# Patient Record
Sex: FEMALE | Race: WHITE | NOT HISPANIC OR LATINO | Employment: FULL TIME | URBAN - METROPOLITAN AREA
[De-identification: names, ages, dates, MRNs, and addresses within clinical notes are randomized per-mention and may not be internally consistent; named-entity substitution may affect disease eponyms.]

---

## 2017-04-27 ENCOUNTER — TRANSFERRED RECORDS (OUTPATIENT)
Dept: HEALTH INFORMATION MANAGEMENT | Facility: CLINIC | Age: 32
End: 2017-04-27

## 2017-05-02 ENCOUNTER — TRANSFERRED RECORDS (OUTPATIENT)
Dept: HEALTH INFORMATION MANAGEMENT | Facility: CLINIC | Age: 32
End: 2017-05-02

## 2017-07-22 ENCOUNTER — HEALTH MAINTENANCE LETTER (OUTPATIENT)
Age: 32
End: 2017-07-22

## 2017-10-23 ENCOUNTER — OFFICE VISIT (OUTPATIENT)
Dept: FAMILY MEDICINE | Facility: CLINIC | Age: 32
End: 2017-10-23
Payer: COMMERCIAL

## 2017-10-23 VITALS
TEMPERATURE: 98 F | HEART RATE: 76 BPM | SYSTOLIC BLOOD PRESSURE: 119 MMHG | WEIGHT: 182 LBS | DIASTOLIC BLOOD PRESSURE: 78 MMHG | HEIGHT: 64 IN | OXYGEN SATURATION: 98 % | BODY MASS INDEX: 31.07 KG/M2

## 2017-10-23 DIAGNOSIS — Z01.818 PREOP GENERAL PHYSICAL EXAM: Primary | ICD-10-CM

## 2017-10-23 DIAGNOSIS — N63.0 LUMP OR MASS IN BREAST: ICD-10-CM

## 2017-10-23 DIAGNOSIS — Z13.6 CARDIOVASCULAR SCREENING; LDL GOAL LESS THAN 160: ICD-10-CM

## 2017-10-23 LAB
BETA HCG QUAL IFA URINE: NEGATIVE
ERYTHROCYTE [DISTWIDTH] IN BLOOD BY AUTOMATED COUNT: 12.7 % (ref 10–15)
HCT VFR BLD AUTO: 41.4 % (ref 35–47)
HGB BLD-MCNC: 13.9 G/DL (ref 11.7–15.7)
MCH RBC QN AUTO: 31.4 PG (ref 26.5–33)
MCHC RBC AUTO-ENTMCNC: 33.6 G/DL (ref 31.5–36.5)
MCV RBC AUTO: 94 FL (ref 78–100)
PLATELET # BLD AUTO: 188 10E9/L (ref 150–450)
RBC # BLD AUTO: 4.42 10E12/L (ref 3.8–5.2)
WBC # BLD AUTO: 8.9 10E9/L (ref 4–11)

## 2017-10-23 PROCEDURE — 85027 COMPLETE CBC AUTOMATED: CPT | Performed by: FAMILY MEDICINE

## 2017-10-23 PROCEDURE — 80048 BASIC METABOLIC PNL TOTAL CA: CPT | Performed by: FAMILY MEDICINE

## 2017-10-23 PROCEDURE — 93000 ELECTROCARDIOGRAM COMPLETE: CPT | Performed by: FAMILY MEDICINE

## 2017-10-23 PROCEDURE — 84703 CHORIONIC GONADOTROPIN ASSAY: CPT | Performed by: FAMILY MEDICINE

## 2017-10-23 PROCEDURE — 36415 COLL VENOUS BLD VENIPUNCTURE: CPT | Performed by: FAMILY MEDICINE

## 2017-10-23 PROCEDURE — 80061 LIPID PANEL: CPT | Performed by: FAMILY MEDICINE

## 2017-10-23 PROCEDURE — 99214 OFFICE O/P EST MOD 30 MIN: CPT | Performed by: FAMILY MEDICINE

## 2017-10-23 NOTE — NURSING NOTE
"Chief Complaint   Patient presents with     Pre-Op Exam       Initial /78  Pulse 76  Temp 98  F (36.7  C) (Oral)  Ht 5' 3.5\" (1.613 m)  Wt 182 lb (82.6 kg)  LMP 09/28/2017  SpO2 98%  BMI 31.73 kg/m2 Estimated body mass index is 31.73 kg/(m^2) as calculated from the following:    Height as of this encounter: 5' 3.5\" (1.613 m).    Weight as of this encounter: 182 lb (82.6 kg).  Medication Reconciliation: complete   Alison Rodriguez Certified Medical Assistant    "
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
## 2017-10-23 NOTE — MR AVS SNAPSHOT
After Visit Summary   10/23/2017    Melisa Burns    MRN: [id number]           Patient Information     Date Of Birth          1985        Visit Information        Provider Department      10/23/2017 9:40 AM Weiler, Karen, MD [facility]        Today's Diagnoses     Preop general physical exam    -  1      Care Instructions      Before Your Surgery      Call your surgeon if there is any change in your health. This includes signs of a cold or flu (such as a sore throat, runny nose, cough, rash or fever).    Do not smoke, drink alcohol or take over the counter medicine (unless your surgeon or primary care doctor tells you to) for the 24 hours before and after surgery.    If you take prescribed drugs: Follow your doctor s orders about which medicines to take and which to stop until after surgery.    Eating and drinking prior to surgery: follow the instructions from your surgeon    Take a shower or bath the night before surgery. Use the soap your surgeon gave you to gently clean your skin. If you do not have soap from your surgeon, use your regular soap. Do not shave or scrub the surgery site.  Wear clean pajamas and have clean sheets on your bed.     [facility]  Radiology should be contacting you to schedule your Mammogram/US.  If they do not contact you, you can call [phone] to make an appointment.              Follow-ups after your visit        Who to contact     If you have questions or need follow up information about today's clinic visit or your schedule please contact [facility] directly at [phone].  Normal or non-critical lab and imaging results will be communicated to you by MyChart, letter or phone within 4 business days after the clinic has received the results. If you do not hear from us within 7 days, please contact the clinic through MyChart or phone. If you have a critical or abnormal lab result, we will notify you by phone as soon as  "possible.  Submit refill requests through [email or web address] or call your pharmacy and they will forward the refill request to us. Please allow 3 business days for your refill to be completed.          Additional Information About Your Visit        SPORTLOG[employer]Qhart Information     [email or web address] gives you secure access to your electronic health record. If you see a primary care provider, you can also send messages to your care team and make appointments. If you have questions, please call your primary care clinic.  If you do not have a primary care provider, please call [phone] and they will assist you.        Care EveryWhere ID     This is your Care EveryWhere ID. This could be used by other organizations to access your [facility] medical records  [id number]        Your Vitals Were     Pulse Temperature Height Last Period Pulse Oximetry BMI (Body Mass Index)    76 98  F (36.7  C) (Oral) 5' 3.5\" (1.613 m) 09/28/2017 98% 31.73 kg/m2       Blood Pressure from Last 3 Encounters:   10/23/17 119/78   11/07/16 126/84   01/05/16 118/76    Weight from Last 3 Encounters:   10/23/17 182 lb (82.6 kg)   11/07/16 184 lb (83.5 kg)   01/05/16 192 lb (87.1 kg)              We Performed the Following     Beta HCG qual IFA urine - [facility] and [facility]     EKG 12-lead complete w/read - Clinics        Primary Care Provider Office Phone # Fax #    Aniyah JAYLON Geller [phone]       [address] [place] [address]        Equal Access to Services     [facility] AH: Hadii aad ku hadasho Soomaali, waaxda luqadaha, qaybta kaalmada adeegyada, raina benson. So [facility] [phone].    ATENCIÓN: Si habla español, tiene a duke disposición servicios gratuitos de asistencia lingüística. Llame al [phone].    We comply with applicable federal civil rights laws and [place] laws. We do not discriminate on the basis of race, color, national origin, age, disability, sex, sexual orientation, or gender " identity.            Thank you!     Thank you for choosing [facility]  for your care. Our goal is always to provide you with excellent care. Hearing back from our patients is one way we can continue to improve our services. Please take a few minutes to complete the written survey that you may receive in the mail after your visit with us. Thank you!             Your Updated Medication List - Protect others around you: Learn how to safely use, store and throw away your medicines at www.disposemymeds.org.      Notice  As of 10/23/2017 10:43 AM    You have not been prescribed any medications.

## 2017-10-23 NOTE — PATIENT INSTRUCTIONS
Before Your Surgery      Call your surgeon if there is any change in your health. This includes signs of a cold or flu (such as a sore throat, runny nose, cough, rash or fever).    Do not smoke, drink alcohol or take over the counter medicine (unless your surgeon or primary care doctor tells you to) for the 24 hours before and after surgery.    If you take prescribed drugs: Follow your doctor s orders about which medicines to take and which to stop until after surgery.    Eating and drinking prior to surgery: follow the instructions from your surgeon    Take a shower or bath the night before surgery. Use the soap your surgeon gave you to gently clean your skin. If you do not have soap from your surgeon, use your regular soap. Do not shave or scrub the surgery site.  Wear clean pajamas and have clean sheets on your bed.     [facility]  Radiology should be contacting you to schedule your Mammogram/US.  If they do not contact you, you can call [phone] to make an appointment.

## 2017-10-24 LAB
ANION GAP SERPL CALCULATED.3IONS-SCNC: 7 MMOL/L (ref 3–14)
BUN SERPL-MCNC: 10 MG/DL (ref 7–30)
CALCIUM SERPL-MCNC: 8.6 MG/DL (ref 8.5–10.1)
CHLORIDE SERPL-SCNC: 102 MMOL/L (ref 94–109)
CHOLEST SERPL-MCNC: 183 MG/DL
CO2 SERPL-SCNC: 25 MMOL/L (ref 20–32)
CREAT SERPL-MCNC: 0.74 MG/DL (ref 0.52–1.04)
GFR SERPL CREATININE-BSD FRML MDRD: >90 ML/MIN/1.7M2
GLUCOSE SERPL-MCNC: 69 MG/DL (ref 70–99)
HDLC SERPL-MCNC: 57 MG/DL
LDLC SERPL CALC-MCNC: 101 MG/DL
NONHDLC SERPL-MCNC: 126 MG/DL
POTASSIUM SERPL-SCNC: 3.5 MMOL/L (ref 3.4–5.3)
SODIUM SERPL-SCNC: 134 MMOL/L (ref 133–144)
TRIGL SERPL-MCNC: 124 MG/DL

## 2017-10-24 NOTE — PROGRESS NOTES
Dear Melisa,    Dr. Weiler has reviewed your recent labs, which were all normal.    If you have further questions about the interpretation of your labs, [email or web address] is a good website to check out for further information.    Please contact the clinic if you have additional questions.  Thank you.    Sincerely,    Leslie Barajas PA-C  Physician extender for Dr. Karen Weiler

## 2017-10-25 ENCOUNTER — HOSPITAL ENCOUNTER (OUTPATIENT)
Dept: ULTRASOUND IMAGING | Facility: CLINIC | Age: 32
End: 2017-10-25
Attending: FAMILY MEDICINE
Payer: COMMERCIAL

## 2017-10-25 ENCOUNTER — HOSPITAL ENCOUNTER (OUTPATIENT)
Dept: MAMMOGRAPHY | Facility: CLINIC | Age: 32
Discharge: HOME | End: 2017-10-25
Attending: FAMILY MEDICINE | Admitting: FAMILY MEDICINE
Payer: COMMERCIAL

## 2017-10-25 DIAGNOSIS — N63.0 LUMP OR MASS IN BREAST: ICD-10-CM

## 2017-10-25 PROCEDURE — 76642 ULTRASOUND BREAST LIMITED: CPT | Mod: RT

## 2017-10-25 PROCEDURE — G0204 DX MAMMO INCL CAD BI: HCPCS

## 2017-10-25 NOTE — LETTER
[facility] Breast Ultrasound  [address] [facility], [address]  [facility] [address]                                                                                                              Melisa Burns  [address] [place] [address]    October 25, 2017  Date of Exam:     Dear Melisa:    Thank you for your recent visit.  Breast Imaging Result: We are pleased to inform you that the results of your recent breast imaging show no evidence of malignancy (cancer).    Breast Density: Your mammogram shows that you have dense breast tissue. This means you have a slightly higher risk of getting breast cancer. It also means your mammograms will be harder to read but it doesn't mean that mammograms aren t useful. In fact, yearly mammograms are even more important for women at higher risk.    If you are experiencing any breast problems such as a lump or localized pain we request that you discuss this with your health care provider if you haven t already done so, as additional testing may be necessary.    As you know, early detection of cancer is very important. Although mammography is the most accurate method for early detection, not all cancers are found through mammography. A thorough examination includes a combination of mammography, physical examination and breast self-examination. Currently the American College of Radiology and the Society of Breast Imaging recommend an annual mammogram for all women beginning at the age of 40.    A report of your breast imaging results was sent to: Karen Weiler    Your breast imaging will become part of your medical file here at [facility] for at least 10 years. You are responsible for informing any new health care provider or breast imaging facility of the date and location of this examination.    We appreciate the opportunity to participate in your health care.    Sincerely,    Michelle Bustillo MD  Interpreting Radiologist  [facility] Breast  Ultrasound

## 2017-10-26 NOTE — PROGRESS NOTES
Dear Melisa,    Your recent breast imaging was normal; there were no concerning findings. We do have the option of having you see a breast specialist if you are interested.    Please contact the clinic if you have additional questions.  Thank you.    Sincerely,    Leslie Barajas PA-C  Physician extender for Dr. Karen Weiler

## 2018-01-15 ENCOUNTER — TRANSFERRED RECORDS (OUTPATIENT)
Dept: HEALTH INFORMATION MANAGEMENT | Facility: CLINIC | Age: 33
End: 2018-01-15

## 2018-09-27 ENCOUNTER — TELEPHONE (OUTPATIENT)
Dept: FAMILY MEDICINE | Facility: CLINIC | Age: 33
End: 2018-09-27

## 2018-09-27 NOTE — TELEPHONE ENCOUNTER
Needs of attention regarding:  -Cervical Cancer Screening    Health Maintenance Topics with due status: Overdue       Topic Date Due    PAP SCREENING Q3 YR (SYSTEM ASSIGNED) 12/01/2014    INFLUENZA VACCINE 09/01/2018     Health Maintenance Topics with due status: Due Soon       Topic Date Due    PHQ-2 Q1 YR 10/23/2018       Communication:  See Letter

## 2018-09-27 NOTE — LETTER
[facility]          [address] [place] [address]                                                                                                       [phone]  September 27, 2018    Melisa Burns  [address] [place] [address]      Dear Melisa,    A review of your record shows that you are due for the following health maintenance exam(s):    -Pap Smear- a screening test done during a pelvic exam to check for abnormal changes in the cells of the cervix. The cervix is the lower part of the uterus that opens into the vagina. Abnormal cells can develop into cancer if not detected and treated. There are no signs or symptoms related to early cervical cancer so a pelvic exam of the female sex organs and a Pap test are needed. Cervical cancer is preventable and curable if abnormal cells are detected and treated early. Pap tests have reduced deaths from cancer of the cervix in the [place] by 70% over the past 50 years.  Please call to arrange this for you or you can also schedule this through [facility] (online medical record access).  Please disregard this reminder if you have already scheduled or have had this exam elsewhere within the last year.  It would be helpful for us to have a copy of your pap smear report in our file so that we can best coordinate your care.     In addition, if we see you for your annual health care maintenance exam (complete physical), and it has been over a year since your last exam, then please schedule that as well.    We now have OB/GYN providers on site! You may schedule with either Michelle RAMEY CNM or Dr. Giaan Martinez at [phone].    Thank you very much for choosing [facility].   We appreciate the opportunity to serve you and look forward to supporting your healthcare needs in the future.

## 2020-02-10 ENCOUNTER — HEALTH MAINTENANCE LETTER (OUTPATIENT)
Age: 35
End: 2020-02-10

## 2020-08-27 ENCOUNTER — TRANSFERRED RECORDS (OUTPATIENT)
Dept: HEALTH INFORMATION MANAGEMENT | Facility: CLINIC | Age: 35
End: 2020-08-27

## 2020-09-09 ENCOUNTER — TRANSFERRED RECORDS (OUTPATIENT)
Dept: HEALTH INFORMATION MANAGEMENT | Facility: CLINIC | Age: 35
End: 2020-09-09

## 2020-11-16 ENCOUNTER — HEALTH MAINTENANCE LETTER (OUTPATIENT)
Age: 35
End: 2020-11-16

## 2020-11-25 ENCOUNTER — TRANSFERRED RECORDS (OUTPATIENT)
Dept: HEALTH INFORMATION MANAGEMENT | Facility: CLINIC | Age: 35
End: 2020-11-25

## 2020-11-30 ENCOUNTER — NURSE TRIAGE (OUTPATIENT)
Dept: NURSING | Facility: CLINIC | Age: 35
End: 2020-11-30

## 2020-11-30 NOTE — TELEPHONE ENCOUNTER
Melisa reports daily headaches x 1.5 weeks. Advil not helping. Rated 6/10.  Also developed left upper arm pain about the same time. Not sure if she slept on it.   With occasional dizziness.  Sinuses have been clogged about the same time.    Had COVID exposure Nov 9.  Tested negative for COVID around 11/18. No known recent exposure to COVID.    No fever, no runny nose, no cough, no SOB.    PLAN:  - office visit tomorrow  Call back for further concerns   ED for severe headache  Patient verbalized understanding.    Holly Motley RN/[place] Nurse Advisor          Additional Information    Negative: Weakness of the face, arm or leg on one side of the body and new onset    Negative: Numbness of the face, arm or leg on one side of the body and new onset    Negative: Difficult to awaken or acting confused (e.g., disoriented, slurred speech)    Negative: Loss of speech or garbled speech and new onset    Negative: Passed out (i.e., fainted, collapsed and was not responding)    Negative: Sounds like a life-threatening emergency to the triager    Negative: Unable to walk without falling    Negative: Stiff neck (can't touch chin to chest)    Negative: Possibility of carbon monoxide exposure    Negative: SEVERE headache, states 'worst headache' of life    Negative: SEVERE headache, sudden onset (i.e., reaching maximum intensity within 30 seconds)    Negative: Severe pain in one eye    Negative: Loss of vision or double vision    Negative: Patient sounds very sick or weak to the triager    Negative: Fever > 103 F (39.4 C)    Negative: Fever > 100.0 F (37.8 C) and has diabetes mellitus or a weak immune system (e.g., HIV positive, cancer chemotherapy, organ transplant, splenectomy, chronic steroids)    Negative: SEVERE headache (e.g., excruciating) and has had severe headaches before    Negative: SEVERE headache and not relieved by pain meds    Negative: SEVERE headache and vomiting    Negative: SEVERE headache and fever     Negative: New headache and weak immune system (e.g., HIV positive, cancer chemotherapy, chronic steroid treatment)    Negative: Fever present > 3 days (72 hours)    Negative: Patient wants to be seen    Unexplained headache that is present > 24 hours    Protocols used: HEADACHE-A-OH

## 2020-12-04 ENCOUNTER — OFFICE VISIT (OUTPATIENT)
Dept: FAMILY MEDICINE | Facility: CLINIC | Age: 35
End: 2020-12-04
Payer: COMMERCIAL

## 2020-12-04 VITALS
OXYGEN SATURATION: 99 % | SYSTOLIC BLOOD PRESSURE: 122 MMHG | WEIGHT: 197 LBS | HEIGHT: 64 IN | TEMPERATURE: 98.4 F | HEART RATE: 74 BPM | DIASTOLIC BLOOD PRESSURE: 74 MMHG | BODY MASS INDEX: 33.63 KG/M2

## 2020-12-04 DIAGNOSIS — E55.9 VITAMIN D DEFICIENCY: ICD-10-CM

## 2020-12-04 DIAGNOSIS — H53.8 BLURRED VISION: ICD-10-CM

## 2020-12-04 DIAGNOSIS — M79.662 PAIN OF LEFT CALF: ICD-10-CM

## 2020-12-04 DIAGNOSIS — R29.2 DECREASED PATELLAR REFLEX: ICD-10-CM

## 2020-12-04 DIAGNOSIS — J35.1 ENLARGED TONSILS: ICD-10-CM

## 2020-12-04 DIAGNOSIS — R51.9 ACUTE NONINTRACTABLE HEADACHE, UNSPECIFIED HEADACHE TYPE: Primary | ICD-10-CM

## 2020-12-04 DIAGNOSIS — Z13.220 SCREENING FOR CHOLESTEROL LEVEL: ICD-10-CM

## 2020-12-04 DIAGNOSIS — Z23 NEED FOR INFLUENZA VACCINATION: ICD-10-CM

## 2020-12-04 LAB
ALBUMIN SERPL-MCNC: 3.8 G/DL (ref 3.4–5)
ALP SERPL-CCNC: 39 U/L (ref 40–150)
ALT SERPL W P-5'-P-CCNC: 77 U/L (ref 0–50)
ANION GAP SERPL CALCULATED.3IONS-SCNC: 4 MMOL/L (ref 3–14)
AST SERPL W P-5'-P-CCNC: 38 U/L (ref 0–45)
BILIRUB SERPL-MCNC: 1.5 MG/DL (ref 0.2–1.3)
BUN SERPL-MCNC: 11 MG/DL (ref 7–30)
CALCIUM SERPL-MCNC: 9 MG/DL (ref 8.5–10.1)
CHLORIDE SERPL-SCNC: 106 MMOL/L (ref 94–109)
CHOLEST SERPL-MCNC: 232 MG/DL
CO2 SERPL-SCNC: 27 MMOL/L (ref 20–32)
CREAT SERPL-MCNC: 0.85 MG/DL (ref 0.52–1.04)
DEPRECATED S PYO AG THROAT QL EIA: NEGATIVE
ERYTHROCYTE [DISTWIDTH] IN BLOOD BY AUTOMATED COUNT: 11.5 % (ref 10–15)
GFR SERPL CREATININE-BSD FRML MDRD: 87 ML/MIN/{1.73_M2}
GLUCOSE SERPL-MCNC: 92 MG/DL (ref 70–99)
HBA1C MFR BLD: 5.1 % (ref 0–5.6)
HCT VFR BLD AUTO: 40.1 % (ref 35–47)
HDLC SERPL-MCNC: 50 MG/DL
HGB BLD-MCNC: 13.3 G/DL (ref 11.7–15.7)
LDLC SERPL CALC-MCNC: 150 MG/DL
MCH RBC QN AUTO: 30.2 PG (ref 26.5–33)
MCHC RBC AUTO-ENTMCNC: 33.2 G/DL (ref 31.5–36.5)
MCV RBC AUTO: 91 FL (ref 78–100)
NONHDLC SERPL-MCNC: 182 MG/DL
PLATELET # BLD AUTO: 193 10E9/L (ref 150–450)
POTASSIUM SERPL-SCNC: 3.8 MMOL/L (ref 3.4–5.3)
PROT SERPL-MCNC: 7.6 G/DL (ref 6.8–8.8)
RBC # BLD AUTO: 4.41 10E12/L (ref 3.8–5.2)
SODIUM SERPL-SCNC: 137 MMOL/L (ref 133–144)
SPECIMEN SOURCE: NORMAL
SPECIMEN SOURCE: NORMAL
STREP GROUP A PCR: NOT DETECTED
TRIGL SERPL-MCNC: 161 MG/DL
TSH SERPL DL<=0.005 MIU/L-ACNC: 0.8 MU/L (ref 0.4–4)
WBC # BLD AUTO: 6 10E9/L (ref 4–11)

## 2020-12-04 PROCEDURE — 87651 STREP A DNA AMP PROBE: CPT | Performed by: NURSE PRACTITIONER

## 2020-12-04 PROCEDURE — 99N1174 PR STATISTIC STREP A RAPID: Performed by: NURSE PRACTITIONER

## 2020-12-04 PROCEDURE — 80061 LIPID PANEL: CPT | Performed by: NURSE PRACTITIONER

## 2020-12-04 PROCEDURE — 82306 VITAMIN D 25 HYDROXY: CPT | Performed by: NURSE PRACTITIONER

## 2020-12-04 PROCEDURE — 85027 COMPLETE CBC AUTOMATED: CPT | Performed by: NURSE PRACTITIONER

## 2020-12-04 PROCEDURE — 83036 HEMOGLOBIN GLYCOSYLATED A1C: CPT | Performed by: NURSE PRACTITIONER

## 2020-12-04 PROCEDURE — 36415 COLL VENOUS BLD VENIPUNCTURE: CPT | Performed by: NURSE PRACTITIONER

## 2020-12-04 PROCEDURE — 80053 COMPREHEN METABOLIC PANEL: CPT | Performed by: NURSE PRACTITIONER

## 2020-12-04 PROCEDURE — 84443 ASSAY THYROID STIM HORMONE: CPT | Performed by: NURSE PRACTITIONER

## 2020-12-04 PROCEDURE — 99214 OFFICE O/P EST MOD 30 MIN: CPT | Mod: 25 | Performed by: NURSE PRACTITIONER

## 2020-12-04 PROCEDURE — 90686 IIV4 VACC NO PRSV 0.5 ML IM: CPT | Performed by: NURSE PRACTITIONER

## 2020-12-04 PROCEDURE — 90471 IMMUNIZATION ADMIN: CPT | Performed by: NURSE PRACTITIONER

## 2020-12-04 RX ORDER — METHYLPREDNISOLONE 4 MG
TABLET, DOSE PACK ORAL
Qty: 21 TABLET | Refills: 0 | Status: SHIPPED | OUTPATIENT
Start: 2020-12-04 | End: 2020-12-18

## 2020-12-04 ASSESSMENT — MIFFLIN-ST. JEOR: SCORE: 1568.59

## 2020-12-04 NOTE — PROGRESS NOTES
Subjective   Melisa Burns is a 35 year old female who presents to clinic today for the following health issues:    HPI   Headache  Onset/Duration: x3 weeks  Description  Location: sides - top,    Character: hard to describe -   Frequency:  daily  Duration:  hours  Wake with headaches: no  Able to do daily activities when headache present: YES- couple times not  Intensity:  5-6/10  Progression of Symptoms:  worsening  Accompanying signs and symptoms:  Stiff neck: YES - right side  Neck or upper back pain: YES- right neck - left upper arm pain  Sinus or URI symptoms YES- little congested  Fever: no  Nausea or vomiting: no  Dizziness: YES- couple times when HA is really bad  Numbness/tingling: no  Weakness: no  Visual changes: Yesterday blurry - could not focus on computer - better yesterday afternoon and today  History  Head trauma: YES- mild wfpsovpoju-8-3 years ago - slipped in garage and hit head on concrete  Family history of migraines: no  Daily pain medication use: no  Previous tests for headaches: no  Neurologist evaluation: no  Precipitating or Alleviating factors (light/sound/sleep/caffeine): Sleep - better when wakes up. No alcohol 6-8 weeks ago.  New job -   Therapies tried and outcome: Ibuprofen 400mg PRN (Advil, Motrin)              Outcome - no longer effective  Frequent/daily pain medication use: no    Has been working with ortho for over 6 months on left leg pain.    COVID-negative.    Computer. Hair up Hair down.   Daily Early afternoon.  EYE exam; Lasix 3 years.     New job 6 weeks ago.       Reviewed and updated as needed this visit by provider:  Tobacco  Allergies  Meds  Problems  Med Hx  Surg Hx  Fam Hx          Review of Systems   Constitutional, HEENT, cardiovascular, pulmonary, GI, , musculoskeletal, neuro, skin, endocrine and psych systems are negative, except as otherwise noted in the HPI.          Objective   /74   Pulse 74   Temp 98.4  F (36.9  C)  "(Tympanic)   Ht 1.626 m (5' 4\")   Wt 89.4 kg (197 lb)   LMP 11/01/2020 (Exact Date)   SpO2 99%   Breastfeeding No   BMI 33.81 kg/m   Body mass index is 33.81 kg/m .  Physical Exam   GENERAL: healthy, alert, well nourished, well hydrated, no distress  EYES: Eyes grossly normal to inspection, extraocular movements - intact, and PERRL  HENT: ear canals- normal; TMs- normal; Nose- normal; Mouth- no ulcers, no lesions enlarged versus hypertrophic tonsils bilateral  NECK: no tenderness, no adenopathy, no asymmetry, no masses, no stiffness; thyroid- normal to palpation  RESP: lungs clear to auscultation - no rales, no rhonchi, no wheezes  CV: regular rates and rhythm, normal S1 S2, no S3 or S4 and no murmur, no click or rub -  ABDOMEN: soft, no tenderness, no  hepatosplenomegaly, no masses, normal bowel sounds  MS: extremities- no gross deformities noted, no edema  SKIN: no suspicious lesions, no rashes  NEURO: strength and tone- normal, sensory exam- grossly normal, mentation- intact, speech- normal, reflexes- symmetric decreaed patellar reflexes.   PSYCH: Alert and oriented times 3; speech- coherent , normal rate and volume; able to articulate logical thoughts, able to abstract reason, no tangential thoughts, no hallucinations or delusions, affect- normal  LYMPHATICS: ant. cervical- normal, post. cervical- normal, axillary- normal, supraclavicular- normal, inguinal- normal    Diagnostic Test Results  Pending      Assessment & Plan   Melisa was seen today for headache and new patient.    Diagnoses and all orders for this visit:    Acute nonintractable headache, unspecified headache type   Blurred vision  Decreased patellar reflex   Hard to describe persisting headache and blurred vision.  Exam reassuring does have decreased patellar reflexes.   Labs.   Steroids.   Imaging to rule out demyelinating disease or other etiology. Encourage her to ask insurance about coverage.   Close follow up in clinic within 2 weeks. "   Red flag symptoms discussed and if these occur present to the emergency room or call 911.  Melisa verbalizes understanding of plan of care and is in agreement.   -     MR Brain w/o & w Contrast; Future  -     MR Cervical Spine w/o & w Contrast; Future  -     TSH with free T4 reflex  -     CBC with platelets  -     Comprehensive metabolic panel (BMP + Alb, Alk Phos, ALT, AST, Total. Bili, TP)  -     methylPREDNISolone (MEDROL DOSEPAK) 4 MG tablet therapy pack; Follow Package Directions  -     Hemoglobin A1c  -     Streptococcus A Rapid Scr w Reflx to PCR  -     Group A Streptococcus PCR Throat Swab  -     MR Brain w/o & w Contrast; Future  -     MR Cervical Spine w/o & w Contrast; Future    Pain of left calf  Continue follow up with Ortho.    Vitamin D deficiency  -     Vitamin D Deficiency    Enlarged tonsils  -     Streptococcus A Rapid Scr w Reflx to PCR    Screening for cholesterol level  -     Lipid panel reflex to direct LDL Fasting    Need for influenza vaccination  -     INFLUENZA VACCINE IM > 6 MONTHS VALENT IIV4 [50371]    Other orders  -     Cancel: FLU VAC QUAD SPLIT VIR, IM (6+ MO)      See Patient Instructions    Return in about 2 weeks (around 12/18/2020) for Wellness exam fasting labs, Recheck.     ELIZABETH Thakur-[address] [address]  [email or web address]   Office: [phone]

## 2020-12-04 NOTE — PATIENT INSTRUCTIONS
Patient Education     Understanding Headache Pain  Headache pain can start in different structures in the head. The brain itself doesn't hurt, but other parts of the head do. Headache is a common symptom of illness, such as a cold or the flu. At other times, headaches happen without seeming to be connected to any illness. These are known as primary headaches. Examples of primary headaches include migraine and tension headaches. Very rarely are headaches a sign of a serious medical problem.     What is referred pain?  Referred pain has its source in one place, but is felt in another. For example, pain behind the eyes may actually be caused by tense muscles in the neck and shoulders. This means that the place that hurts may not be the part of the head that needs treatment.   [employer] last reviewed this educational content on 5/1/2018 2000-2020 [employer]. [address] [place] [address], [place] [address]. All rights reserved. This information is not intended as a substitute for professional medical care. Always follow your healthcare professional's instructions.

## 2020-12-08 LAB — DEPRECATED CALCIDIOL+CALCIFEROL SERPL-MC: 29 UG/L (ref 20–75)

## 2020-12-10 NOTE — RESULT ENCOUNTER NOTE
Dear Melisa,    Here is a summary of your recent test results:    Most of your labs looked good expect some mild abnormals described below.     I have ordered your brain and neck MRI; they should be calling you to schedule. It is certainly reasonable to ask your insurance ahead of time if there is any concern for the coverage of this.     Vitamin D is on the low end of normal; I it above 30. I would recommended daily vitamin D 2,000 international units daily.     -LDL(bad) cholesterol level is elevated, and your triglycerides are elevated which can increase your heart disease risk.  A diet high in fat and simple carbohydrates, genetics and being overweight can contribute to this. ADVISE: exercising 150 minutes of aerobic exercise per week (30 minutes for 5 days per week or 50 minutes for 3 days per week are options), eating a low saturated fat/low carbohydrate diet, and omega-3 fatty acids (fish oil) 0295-5687 mg daily are helpful to improve this. In 3 months, you should recheck your fasting cholesterol panel by scheduling a lab-only appointment.    -Liver and gallbladder tests (ALT,AST, Alk phos,bilirubin) are slightly elevated. I don't see previous liver function to compare this lab too. I encourage you to limit alcohol and Tylenol usage and we will repeat this lab in 3 months.     For additional lab test information, labtestsonline.org is an excellent reference.    In addition, here is a list of due or overdue Health Maintenance reminders:    ANNUAL REVIEW OF  ORDERS due on 1984  Hepatitis C Screening due on 01/17/2002  Preventive Care Visit due on 04/03/2008  PAP Smear due on 12/01/2014  PHQ-2 due on 01/01/2020    Please call us at [phone] (or use [employer]) to address the above recommendations if needed.    Thank you for choosing [facility].  It was an honor and a privilege to participate in your care.       Healthy regards,    Elizabeth Barajas, ELIZABETH  [facility]

## 2020-12-18 ENCOUNTER — OFFICE VISIT (OUTPATIENT)
Dept: FAMILY MEDICINE | Facility: CLINIC | Age: 35
End: 2020-12-18
Payer: COMMERCIAL

## 2020-12-18 VITALS
BODY MASS INDEX: 33.29 KG/M2 | HEART RATE: 72 BPM | DIASTOLIC BLOOD PRESSURE: 77 MMHG | OXYGEN SATURATION: 100 % | HEIGHT: 64 IN | WEIGHT: 195 LBS | SYSTOLIC BLOOD PRESSURE: 118 MMHG | TEMPERATURE: 98.3 F

## 2020-12-18 DIAGNOSIS — Z11.59 NEED FOR HEPATITIS C SCREENING TEST: ICD-10-CM

## 2020-12-18 DIAGNOSIS — M25.50 ARTHRALGIA, UNSPECIFIED JOINT: ICD-10-CM

## 2020-12-18 DIAGNOSIS — R74.01 ELEVATED ALT MEASUREMENT: ICD-10-CM

## 2020-12-18 DIAGNOSIS — R51.9 ACUTE NONINTRACTABLE HEADACHE, UNSPECIFIED HEADACHE TYPE: ICD-10-CM

## 2020-12-18 DIAGNOSIS — M79.662 PAIN OF LEFT CALF: ICD-10-CM

## 2020-12-18 DIAGNOSIS — Z12.4 SCREENING FOR MALIGNANT NEOPLASM OF CERVIX: ICD-10-CM

## 2020-12-18 DIAGNOSIS — Z00.00 ROUTINE GENERAL MEDICAL EXAMINATION AT A HEALTH CARE FACILITY: Primary | ICD-10-CM

## 2020-12-18 PROCEDURE — 99213 OFFICE O/P EST LOW 20 MIN: CPT | Mod: 25 | Performed by: NURSE PRACTITIONER

## 2020-12-18 PROCEDURE — 99395 PREV VISIT EST AGE 18-39: CPT | Performed by: NURSE PRACTITIONER

## 2020-12-18 ASSESSMENT — MIFFLIN-ST. JEOR: SCORE: 1559.51
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
## 2021-01-05 DIAGNOSIS — M25.50 ARTHRALGIA, UNSPECIFIED JOINT: ICD-10-CM

## 2021-01-05 DIAGNOSIS — Z11.59 NEED FOR HEPATITIS C SCREENING TEST: ICD-10-CM

## 2021-01-05 DIAGNOSIS — R74.01 ELEVATED ALT MEASUREMENT: ICD-10-CM

## 2021-01-05 DIAGNOSIS — R51.9 ACUTE NONINTRACTABLE HEADACHE, UNSPECIFIED HEADACHE TYPE: ICD-10-CM

## 2021-01-05 LAB
CRP SERPL-MCNC: <2.9 MG/L (ref 0–8)
ERYTHROCYTE [SEDIMENTATION RATE] IN BLOOD BY WESTERGREN METHOD: 12 MM/H (ref 0–20)
HCV AB SERPL QL IA: NONREACTIVE

## 2021-01-05 PROCEDURE — 86038 ANTINUCLEAR ANTIBODIES: CPT | Performed by: NURSE PRACTITIONER

## 2021-01-05 PROCEDURE — 86140 C-REACTIVE PROTEIN: CPT | Performed by: NURSE PRACTITIONER

## 2021-01-05 PROCEDURE — 86039 ANTINUCLEAR ANTIBODIES (ANA): CPT | Performed by: NURSE PRACTITIONER

## 2021-01-05 PROCEDURE — 86431 RHEUMATOID FACTOR QUANT: CPT | Performed by: NURSE PRACTITIONER

## 2021-01-05 PROCEDURE — 86618 LYME DISEASE ANTIBODY: CPT | Performed by: NURSE PRACTITIONER

## 2021-01-05 PROCEDURE — 36415 COLL VENOUS BLD VENIPUNCTURE: CPT | Performed by: NURSE PRACTITIONER

## 2021-01-05 PROCEDURE — 86200 CCP ANTIBODY: CPT | Performed by: NURSE PRACTITIONER

## 2021-01-05 PROCEDURE — 86803 HEPATITIS C AB TEST: CPT | Performed by: NURSE PRACTITIONER

## 2021-01-05 PROCEDURE — 80076 HEPATIC FUNCTION PANEL: CPT | Performed by: NURSE PRACTITIONER

## 2021-01-05 PROCEDURE — 85652 RBC SED RATE AUTOMATED: CPT | Performed by: NURSE PRACTITIONER

## 2021-01-06 LAB
ALBUMIN SERPL-MCNC: 3.6 G/DL (ref 3.4–5)
ALP SERPL-CCNC: 48 U/L (ref 40–150)
ALT SERPL W P-5'-P-CCNC: 96 U/L (ref 0–50)
ANA PAT SER IF-IMP: ABNORMAL
ANA SER QL IF: POSITIVE
ANA TITR SER IF: ABNORMAL {TITER}
AST SERPL W P-5'-P-CCNC: 40 U/L (ref 0–45)
B BURGDOR IGG+IGM SER QL: 0.17 (ref 0–0.89)
BILIRUB DIRECT SERPL-MCNC: 0.2 MG/DL (ref 0–0.2)
BILIRUB SERPL-MCNC: 1.1 MG/DL (ref 0.2–1.3)
CCP AB SER IA-ACNC: 1 U/ML
PROT SERPL-MCNC: 7.1 G/DL (ref 6.8–8.8)
RHEUMATOID FACT SER NEPH-ACNC: <7 IU/ML (ref 0–20)

## 2021-01-07 ENCOUNTER — TRANSFERRED RECORDS (OUTPATIENT)
Dept: HEALTH INFORMATION MANAGEMENT | Facility: CLINIC | Age: 36
End: 2021-01-07

## 2021-01-08 ENCOUNTER — MYC MEDICAL ADVICE (OUTPATIENT)
Dept: FAMILY MEDICINE | Facility: CLINIC | Age: 36
End: 2021-01-08

## 2021-01-08 NOTE — TELEPHONE ENCOUNTER
Routing to JOSEY Barajas NP for further review/recommendations/orders.  Please see 1/5/20 Results and advise      Roseann Hall RN  [facility]

## 2021-01-11 NOTE — TELEPHONE ENCOUNTER
Called patient @ [phone] -     Advised of results below - Patient stated an understanding and agreed with plan.      Roseann Hall RN  [facility]

## 2021-01-11 NOTE — TELEPHONE ENCOUNTER
Please call patient. She has a elevated ALT increased from last check and a borderline positive AMBROSE.  All the rest of her labs are completely within normal limits.    I am going to do a consultation with rheumatology.  I will review the current labs and see if there is further labs they want me to do or they suggest having an appointment with them. This is a nice service we have now to avoid unnecessary appointments and cost-  I will let her know once I get that answer.    Thanks,      Elizabeth Barajas, FNP-BC

## 2021-01-12 ENCOUNTER — TELEPHONE (OUTPATIENT)
Dept: FAMILY MEDICINE | Facility: CLINIC | Age: 36
End: 2021-01-12

## 2021-01-12 DIAGNOSIS — M25.50 ARTHRALGIA, UNSPECIFIED JOINT: ICD-10-CM

## 2021-01-12 DIAGNOSIS — R76.8 POSITIVE ANA (ANTINUCLEAR ANTIBODY): Primary | ICD-10-CM

## 2021-01-12 DIAGNOSIS — H04.123 DRY EYES: ICD-10-CM

## 2021-01-12 DIAGNOSIS — R51.9 ACUTE NONINTRACTABLE HEADACHE, UNSPECIFIED HEADACHE TYPE: ICD-10-CM

## 2021-01-12 NOTE — TELEPHONE ENCOUNTER
Adv pt of message below.    She would like to move forward with you doing the Econsult.     She would also like to know what the next step is with her ALT lab. Recheck? Liver US? Etc.      Shaneka GOLDMAN RN

## 2021-01-12 NOTE — PROGRESS NOTES
I started the consultation Econsult with rheumatology and there is a possibility of a $114 charge for this consultation.  Insurance may or may not cover this so this could be an out-of-pocket cost for patient.  Please ask her what she would like me to do. I can certainly proceed and get their advice for possible a charge of $114 which may or may not be covered by insurance or I can just send a referral to rheumatology and she can complete an appointment which may have better insurance coverage.      Please let me know.         Elizabeth Barajas, JESSICAP-BC

## 2021-01-12 NOTE — TELEPHONE ENCOUNTER
I started the consultation Econsult with rheumatology and there is a possibility of a $114 charge for this consultation.  Insurance may or may not cover this so this could be an out-of-pocket cost for patient.  Please ask her what she would like me to do. I can certainly proceed and get their advice for possible a charge of $114 which may or may not be covered by insurance or I can just send a referral to rheumatology and she can complete an appointment which may have better insurance coverage.      Please let me know.         Elizabeth Barajas, [facility]-BC  ______________________________________________________________________    Attempt # 1    Called #   Telephone Information:   Mobile [phone]         Left a non detailed VM     Melissa Mcfarland RN, BSN  [place] [facility]     ________________________________________________________________________    The above is from the OV encounter / my chart message 1/8/2021    Attempt # 2    Called #   Telephone Information:   Mobile [phone]         Left a non detailed VM     Melissa Mcfarland RN, BSN  [place] [facility]

## 2021-01-12 NOTE — PROGRESS NOTES
Attempt # 1    Called #   Telephone Information:   Mobile [phone]       Left a non detailed VM     Melissa Mcfarland RN, BSN  [place] [facility]

## 2021-01-12 NOTE — TELEPHONE ENCOUNTER
I will advise on her labs once I consult rheumatology. Consult has been sent.       Thanks,     ELIZABETH Thakur

## 2021-01-12 NOTE — TELEPHONE ENCOUNTER
Patient calls asking to speak to Triage Nurse.  Patient states she had a message left for her to call back.  Patient states she was waiting to hear if anymore lab work needed to be done.  No pending orders.  Please advise.

## 2021-01-19 ENCOUNTER — E-CONSULT (OUTPATIENT)
Dept: RHEUMATOLOGY | Facility: CLINIC | Age: 36
End: 2021-01-19

## 2021-01-19 NOTE — PROGRESS NOTES
1/19/2021     E-Consult has been accepted.    Interprofessional consultation requested by: JOSEY Barajas    Clinical Question/Purpose: ? signficance + AMBROSE    Patient assessment and information reviewed: notes, labs     Recommendations:     Although the AMBROSE of 1:160 is borderline, the speckled pattern could be indicative of a diagnoseable autoimmune disease process, including possible SLE, Sjogren's or Scleroderma.    Suggest:     1. Order DADA, DsDNA, urinalysis( looking for blood and/or protein; don't perform during Menstrual period)     2. Ask  IF PATIENT HAS ANY OF THE FOLLOWING:    - AM stiffness of joints of hands > 30 min: Not Asked    - Sun sensitive rashes: Not asked    - Raynauds Phenomenon (development of cyanosis or significant pallor of hands with cold exposure): Not Asked    - Dry eyes or mouth not associated with a medication: Not Asked     If any of the labs in #1 are positive and/or if any of the clinical symptoms in #2 are present , she should be sent for formal consult.         The recommendations provided in this E-Consult are based on the clinical data available to me at this time, and are furnished without the benefit of a comprehensive in-person or virtual patient evaluation, Any new clinical issues or changes in patient status since the filing of this E-Consult will need to be taken into account when assessing these recommendations. Please contact me if you have further questions.    My total time spent reviewing clinical information and formulating assessment was 5 minutes.    Report sent automatically to requesting provider once signed.     Charge codes: [id number] (5+ minutes)                  [id number] (No Charge code)    Demetri West MD

## 2021-01-21 NOTE — TELEPHONE ENCOUNTER
Inadvertently forwarded to PCP, Routing to Elizabeth Barajas.    Lb MCLEOD RN   [facility]

## 2021-01-21 NOTE — TELEPHONE ENCOUNTER
Please call patient. I have heard back from the e-consult with rheumatology.     They would like the following questions asked if any are yes they would like her to have an appointment with rheumatology if all are no I will complete some further lab tests per their recommendation.     Please route back to me so I am aware of what is going on.     Ask  IF PATIENT HAS ANY OF THE FOLLOWING:    - AM stiffness of joints of hands > 30 min:    - Sun sensitive rashes:    - Raynauds Phenomenon (development of cyanosis or significant pallor of hands with cold exposure):    - Dry eyes or mouth not associated with a medication:     Thanks,      Elizabeth Barajas, FNP-BC

## 2021-01-21 NOTE — TELEPHONE ENCOUNTER
Called # [phone]   AM stiffness of joints of hands > 30 min: No   - Sun sensitive rashes: not positive    - Raynauds Phenomenon (development of cyanosis or significant pallor of hands with cold exposure): No   - Dry eyes or mouth not associated with a medication: Pt noted dry eyes      Lb Villanueva RN   [facility]

## 2021-01-22 NOTE — TELEPHONE ENCOUNTER
Referral was to [facility] - they will call patient to schedule      Roseann Hall RN  [facility]

## 2021-01-22 NOTE — TELEPHONE ENCOUNTER
Given she reports dry eyes according to rheumatology she meets criteria for referral.     Referral to rheumatology placed please fax this referral I believe it needs faxed.       Thanks,       ELIZABETH Thakur

## 2021-02-22 ENCOUNTER — TRANSFERRED RECORDS (OUTPATIENT)
Dept: HEALTH INFORMATION MANAGEMENT | Facility: CLINIC | Age: 36
End: 2021-02-22

## 2021-02-22 ENCOUNTER — OFFICE VISIT - HEALTHEAST (OUTPATIENT)
Dept: RHEUMATOLOGY | Facility: CLINIC | Age: 36
End: 2021-02-22

## 2021-02-22 DIAGNOSIS — M79.18 BUTTOCK PAIN: ICD-10-CM

## 2021-02-22 DIAGNOSIS — M25.551 CHRONIC HIP PAIN, BILATERAL: ICD-10-CM

## 2021-02-22 DIAGNOSIS — G89.29 CHRONIC PAIN OF LEFT LOWER EXTREMITY: ICD-10-CM

## 2021-02-22 DIAGNOSIS — R74.01 TRANSAMINITIS: ICD-10-CM

## 2021-02-22 DIAGNOSIS — M79.662 PAIN OF LEFT CALF: ICD-10-CM

## 2021-02-22 DIAGNOSIS — M25.561 CHRONIC PAIN OF BOTH KNEES: ICD-10-CM

## 2021-02-22 DIAGNOSIS — M79.605 CHRONIC PAIN OF LEFT LOWER EXTREMITY: ICD-10-CM

## 2021-02-22 DIAGNOSIS — M25.562 CHRONIC PAIN OF BOTH KNEES: ICD-10-CM

## 2021-02-22 DIAGNOSIS — R76.8 ANA POSITIVE: ICD-10-CM

## 2021-02-22 DIAGNOSIS — G89.29 CHRONIC HIP PAIN, BILATERAL: ICD-10-CM

## 2021-02-22 DIAGNOSIS — M25.552 CHRONIC HIP PAIN, BILATERAL: ICD-10-CM

## 2021-02-22 DIAGNOSIS — G89.29 CHRONIC PAIN OF BOTH KNEES: ICD-10-CM

## 2021-03-02 ENCOUNTER — AMBULATORY - HEALTHEAST (OUTPATIENT)
Dept: LAB | Facility: CLINIC | Age: 36
End: 2021-03-02

## 2021-03-02 ENCOUNTER — RECORDS - HEALTHEAST (OUTPATIENT)
Dept: VASCULAR ULTRASOUND | Facility: CLINIC | Age: 36
End: 2021-03-02

## 2021-03-02 ENCOUNTER — HOSPITAL ENCOUNTER (OUTPATIENT)
Dept: RADIOLOGY | Facility: CLINIC | Age: 36
Discharge: HOME | End: 2021-03-02
Attending: INTERNAL MEDICINE

## 2021-03-02 DIAGNOSIS — M25.551 CHRONIC HIP PAIN, BILATERAL: ICD-10-CM

## 2021-03-02 DIAGNOSIS — M79.605 PAIN IN LEFT LEG: ICD-10-CM

## 2021-03-02 DIAGNOSIS — M79.662 PAIN IN LEFT LOWER LEG: ICD-10-CM

## 2021-03-02 DIAGNOSIS — M79.662 PAIN OF LEFT CALF: ICD-10-CM

## 2021-03-02 DIAGNOSIS — M25.561 CHRONIC PAIN OF BOTH KNEES: ICD-10-CM

## 2021-03-02 DIAGNOSIS — M25.552 CHRONIC HIP PAIN, BILATERAL: ICD-10-CM

## 2021-03-02 DIAGNOSIS — G89.29 CHRONIC HIP PAIN, BILATERAL: ICD-10-CM

## 2021-03-02 DIAGNOSIS — M79.18 BUTTOCK PAIN: ICD-10-CM

## 2021-03-02 DIAGNOSIS — R76.8 ANA POSITIVE: ICD-10-CM

## 2021-03-02 DIAGNOSIS — G89.29 CHRONIC PAIN OF BOTH KNEES: ICD-10-CM

## 2021-03-02 DIAGNOSIS — R74.01 TRANSAMINITIS: ICD-10-CM

## 2021-03-02 DIAGNOSIS — M25.562 CHRONIC PAIN OF BOTH KNEES: ICD-10-CM

## 2021-03-02 DIAGNOSIS — G89.29 OTHER CHRONIC PAIN: ICD-10-CM

## 2021-03-02 LAB
ALBUMIN SERPL-MCNC: 4.2 G/DL (ref 3.5–5)
ALBUMIN UR-MCNC: NEGATIVE MG/DL
ALP SERPL-CCNC: 42 U/L (ref 45–120)
ALT SERPL W P-5'-P-CCNC: 115 U/L (ref 0–45)
APPEARANCE UR: CLEAR
AST SERPL W P-5'-P-CCNC: 78 U/L (ref 0–40)
BILIRUB DIRECT SERPL-MCNC: 0.5 MG/DL
BILIRUB SERPL-MCNC: 1.7 MG/DL (ref 0–1)
BILIRUB UR QL STRIP: NEGATIVE
C REACTIVE PROTEIN LHE: 0.2 MG/DL (ref 0–0.8)
CK SERPL-CCNC: 73 U/L (ref 30–190)
COLOR UR AUTO: YELLOW
CREAT UR-MCNC: 13.1 MG/DL
ERYTHROCYTE [SEDIMENTATION RATE] IN BLOOD BY WESTERGREN METHOD: 16 MM/HR (ref 0–20)
GGT SERPL-CCNC: 108 U/L (ref 0–50)
GLUCOSE UR STRIP-MCNC: NEGATIVE MG/DL
HGB UR QL STRIP: NEGATIVE
KETONES UR STRIP-MCNC: NEGATIVE MG/DL
LEUKOCYTE ESTERASE UR QL STRIP: NEGATIVE
MAGNESIUM SERPL-MCNC: 1.8 MG/DL (ref 1.8–2.6)
MICROALBUMIN UR-MCNC: <0.5 MG/DL (ref 0–1.99)
MICROALBUMIN/CREAT UR: NORMAL MG/G{CREAT}
NITRATE UR QL: NEGATIVE
PH UR STRIP: 6 [PH] (ref 5–8)
PROT SERPL-MCNC: 7.2 G/DL (ref 6–8)
SP GR UR STRIP: 1.01 (ref 1–1.03)
URATE SERPL-MCNC: 5.1 MG/DL (ref 2–7.5)
UROBILINOGEN UR STRIP-ACNC: NORMAL

## 2021-03-03 LAB — CARDIOLIPIN IGA SER IA-ACNC: <0.5 APL U/ML (ref 0–19.9)

## 2021-03-04 LAB
ACE SERPL-CCNC: 32 U/L (ref 9–67)
ANCA IGG TITR SER IF: NORMAL {TITER}
CARDIOLIPIN IGG SER IA-ACNC: <1.6 GPL-U/ML (ref 0–19.9)
CARDIOLIPIN IGM SER IA-ACNC: <0.2 MPL-U/ML (ref 0–19.9)
LA PPP-IMP: NEGATIVE

## 2021-03-07 ENCOUNTER — COMMUNICATION - HEALTHEAST (OUTPATIENT)
Dept: RHEUMATOLOGY | Facility: CLINIC | Age: 36
End: 2021-03-07

## 2021-03-07 ENCOUNTER — AMBULATORY - HEALTHEAST (OUTPATIENT)
Dept: RHEUMATOLOGY | Facility: CLINIC | Age: 36
End: 2021-03-07

## 2021-03-07 DIAGNOSIS — R17 SERUM TOTAL BILIRUBIN ELEVATED: ICD-10-CM

## 2021-03-07 DIAGNOSIS — R79.89 ABNORMAL LIVER FUNCTION TESTS: ICD-10-CM

## 2021-03-09 LAB
B LOCUS: NORMAL
B27TEST METHOD: NORMAL
DNA (DS) ANTIBODY - HISTORICAL: 3 IU
SCL-70 AUTOANTIBODIES - HISTORICAL: 4 EU
SM (SMITH AUTOANTIBODIES - HISTORICAL: 1 EU
SM/RNP AUTOANTIBODIES - HISTORICAL: 4 EU
SS-A/RO AUTOANTIBODIES - HISTORICAL: 3 EU
SS-B/LA AUTOANTIBODIES - HISTORICAL: 0 EU

## 2021-03-10 LAB — JO-1 AUTOANTIBODIES - HISTORICAL: 0 EU

## 2021-03-11 ENCOUNTER — MEDICAL CORRESPONDENCE (OUTPATIENT)
Dept: HEALTH INFORMATION MANAGEMENT | Facility: CLINIC | Age: 36
End: 2021-03-11

## 2021-03-18 DIAGNOSIS — R94.5 NONSPECIFIC ABNORMAL RESULTS OF LIVER FUNCTION STUDY: Primary | ICD-10-CM

## 2021-03-22 ENCOUNTER — COMMUNICATION - HEALTHEAST (OUTPATIENT)
Dept: ADMINISTRATIVE | Facility: CLINIC | Age: 36
End: 2021-03-22

## 2021-03-22 DIAGNOSIS — R79.89 ELEVATED LFTS: ICD-10-CM

## 2021-03-22 DIAGNOSIS — R74.01 TRANSAMINITIS: ICD-10-CM

## 2021-03-25 DIAGNOSIS — R94.5 NONSPECIFIC ABNORMAL RESULTS OF LIVER FUNCTION STUDY: ICD-10-CM

## 2021-03-25 DIAGNOSIS — R74.01 TRANSAMINITIS: Primary | ICD-10-CM

## 2021-03-25 DIAGNOSIS — R79.89 ELEVATED LIVER FUNCTION TESTS: ICD-10-CM

## 2021-03-25 PROCEDURE — 80061 LIPID PANEL: CPT | Performed by: INTERNAL MEDICINE

## 2021-03-25 PROCEDURE — 80076 HEPATIC FUNCTION PANEL: CPT | Performed by: INTERNAL MEDICINE

## 2021-03-25 PROCEDURE — 36415 COLL VENOUS BLD VENIPUNCTURE: CPT | Performed by: INTERNAL MEDICINE

## 2021-03-26 ENCOUNTER — MEDICAL CORRESPONDENCE (OUTPATIENT)
Dept: HEALTH INFORMATION MANAGEMENT | Facility: CLINIC | Age: 36
End: 2021-03-26

## 2021-03-26 LAB
ALBUMIN SERPL-MCNC: 4 G/DL (ref 3.4–5)
ALP SERPL-CCNC: 35 U/L (ref 40–150)
ALT SERPL W P-5'-P-CCNC: 56 U/L (ref 0–50)
AST SERPL W P-5'-P-CCNC: 25 U/L (ref 0–45)
BILIRUB DIRECT SERPL-MCNC: 0.3 MG/DL (ref 0–0.2)
BILIRUB SERPL-MCNC: 1.9 MG/DL (ref 0.2–1.3)
CHOLEST SERPL-MCNC: 176 MG/DL
HDLC SERPL-MCNC: 45 MG/DL
LDLC SERPL CALC-MCNC: 115 MG/DL
NONHDLC SERPL-MCNC: 131 MG/DL
PROT SERPL-MCNC: 7.4 G/DL (ref 6.8–8.8)
TRIGL SERPL-MCNC: 79 MG/DL

## 2021-04-20 ENCOUNTER — RECORDS - HEALTHEAST (OUTPATIENT)
Dept: ADMINISTRATIVE | Facility: OTHER | Age: 36
End: 2021-04-20

## 2021-04-28 ENCOUNTER — OFFICE VISIT - HEALTHEAST (OUTPATIENT)
Dept: RHEUMATOLOGY | Facility: CLINIC | Age: 36
End: 2021-04-28

## 2021-04-28 DIAGNOSIS — R79.89 ABNORMAL LIVER FUNCTION TESTS: ICD-10-CM

## 2021-04-28 DIAGNOSIS — R17 SERUM TOTAL BILIRUBIN ELEVATED: ICD-10-CM

## 2021-04-28 DIAGNOSIS — M79.662 PAIN OF LEFT CALF: ICD-10-CM

## 2021-04-28 DIAGNOSIS — R76.8 ANA POSITIVE: ICD-10-CM

## 2021-04-29 ENCOUNTER — RECORDS - HEALTHEAST (OUTPATIENT)
Dept: RHEUMATOLOGY | Facility: CLINIC | Age: 36
End: 2021-04-29

## 2021-04-30 ENCOUNTER — RECORDS - HEALTHEAST (OUTPATIENT)
Dept: ADMINISTRATIVE | Facility: OTHER | Age: 36
End: 2021-04-30

## 2021-04-30 LAB
ALT SERPL W/O P-5'-P-CCNC: 83 U/L (ref 0–78)
AST SERPL-CCNC: 26 U/L (ref 0–37)
CHOLEST SERPL-MCNC: 159 MG/DL (ref 0–200)
CREAT SERPL-MCNC: 0.86 MG/DL (ref 0.6–1.02)
HDLC SERPL-MCNC: 45 MG/DL (ref 40–60)
INR PPP: 1 (ref 0.9–1.1)
LDLC SERPL CALC-MCNC: 99 MG/DL
TRIGLYCERIDES (HISTORICAL CONVERSION): 74 MG/DL (ref 0–200)

## 2021-05-04 ENCOUNTER — RECORDS - HEALTHEAST (OUTPATIENT)
Dept: ADMINISTRATIVE | Facility: OTHER | Age: 36
End: 2021-05-04

## 2021-05-05 ENCOUNTER — MYC MEDICAL ADVICE (OUTPATIENT)
Dept: FAMILY MEDICINE | Facility: CLINIC | Age: 36
End: 2021-05-05

## 2021-05-05 DIAGNOSIS — E61.1 IRON DEFICIENCY: Primary | ICD-10-CM

## 2021-05-05 NOTE — TELEPHONE ENCOUNTER
Routing to PCP to review mychart message and advise if OTC iron needed and dosage.    Lb MCLEOD RN   [facility]

## 2021-05-06 NOTE — TELEPHONE ENCOUNTER
\    Current Outpatient Medications   Medication     Multiple Vitamins-Minerals (MULTIVITAMIN ADULT PO)     No current facility-administered medications for this visit.      Routing to PCP for further review/recommendations/orders.  Please advise if patient should be taking OTC iron      Roseann Hall RN  [facility]

## 2021-05-07 ENCOUNTER — RECORDS - HEALTHEAST (OUTPATIENT)
Dept: HEALTH INFORMATION MANAGEMENT | Facility: CLINIC | Age: 36
End: 2021-05-07

## 2021-05-10 NOTE — TELEPHONE ENCOUNTER
My chart message sent     Awaiting reply     Melissa Mcfarland RN, BSN  [facility]

## 2021-05-10 NOTE — TELEPHONE ENCOUNTER
Recommend starting ferrous gluconate 324mg 1 tab po daily.    We've entered future orders to recheck for your iron levels again in 1-2 months and you can do this as a lab only appointment.   You can have this done at any [facility] without appt during regular outpt lab hours or by lab only appt at any [facility] clinic.     Please inform patient.

## 2021-05-25 ENCOUNTER — RECORDS - HEALTHEAST (OUTPATIENT)
Dept: ADMINISTRATIVE | Facility: OTHER | Age: 36
End: 2021-05-25

## 2021-06-01 ENCOUNTER — RECORDS - HEALTHEAST (OUTPATIENT)
Dept: ADMINISTRATIVE | Facility: OTHER | Age: 36
End: 2021-06-01

## 2021-06-15 NOTE — PATIENT INSTRUCTIONS - HE
Summary of Your Rheumatology Visit    Next Appointment:   4-5 weeks      Medications:    Please follow directives on pill bottle on how to take medication(s) provided.      Referrals:      Tests:     Please have labs and x-rays that were ordered performed.  Labs include urinalysis, please do not perform when experiencing menstrual period.     Recommend having labs (and x-rays if ordered) performed at a [facility] facility, as results are then automatically uploaded into our in-basket system.  If having labs performed at a [facility] facility then patient should contact/notify us soon thereafter, so we can actively retrieve results from [facility] system.        Injections:        Other:      Regarding symptoms of left-sided chest pain, if symptoms persist recommend that you discuss further with your primary care physician, to possibly further evaluate/manage.

## 2021-06-15 NOTE — PROGRESS NOTES
Melisa Burns who presents today with a chief complaint of  No chief complaint on file.      Joint Pains: Yes  Location: hips, knees  Onset: early last summer  Intensity:  3-4/10  AM Stiffness: n/a  Alleviating/Aggravating Factors: walking or hiking increase pain.   Tolerating Meds: Yes  Other:        ROS:  Patient denies having: persistent dry eyes +  (years), dry mouth, recurrent oral ulcers, patchy alopecia, active rashes, photosensitivity, history of psoriasis, active chest pain + (sometimes to the lt side), active shortness of breath, active cough, active dysuria, history of kidney stones, active abdominal pain, active diarrhea + (sometimes), history of hematochezia, active dysphagia, history of peptic ulcer disease, history of HIV, tuberculosis, hepatitis B or C, Lyme disease, seizure history, raynaud's, active documented fevers, recent infections, difficulty sleeping + (6 months)  or chronic unrefreshing sleep, involuntary weight loss, loss of appetite, excessive fatigue, depression + (since covid), anxiety + (years),  recurrent sinus infections +, history of inflammatory eye diseases (such as uveitis, scleritis, iritis, etc).     No hx of miscarriages.     Information gathered by medical assistant incorporated into this note, was reviewed and discussed with the patient.    Problem List:  There is no problem list on file for this patient.       PMH:   No past medical history on file.    Surgical History:  No past surgical history on file.    Family History:  No family history on file.    Social History:       Allergies:  Not on File     Current Medications:  No current outpatient medications on file.     No current facility-administered medications for this visit.            Physical Exam:  Following up today via video visit, per Covid-19 pandemic requirements.    Verbal consent has been obtained for this service by care team member.    Video call start time: 9:25 AM horacio Lima. Start time:  9:33  AM    Video call end time:  10:16 AM    Shahida utilized for video call.    Phone number utilized: [[phone]]    Patient location for video visit: Home     Provider location for video visit:  Home (working remotely)        Summary/Assessment:    Pleasant 36-year-old female presents with complaints of having positive AMBROSE and left greater than right calf pain primarily going on for greater than 6 months.  Patient is a runner (for over 8 years), initially thought perhaps related to running however despite discontinuing his still been experiencing calf pains with increased activity, even just climbing stairs.  Has also noticed associated knee pains, left greater than right.    Most days drink sufficient amount of fluids daily however some days not.    Denies having any joint swelling.    Admits to being hypermobile, has had shoulder dislocations in the past with right shoulder torn labrum repair.    Occasionally has buttock discomfort and hip pains going on since 2014.    Admits to being stressed/having component of anxiety, partly due to line of work as a .  Lately also feeling some depression with symptoms and Covid.  Patient works remotely.    Has already had work-up via chiropractor, orthopedics and pursue PT with no clear underlying etiology and no significant symptom relief.    With regards to positive AMBROSE 1-160 titer with speckled pattern.    Noted to have elevated liver enzymes.  Perhaps stemming from muscle.    Lately noted to have elevated cholesterol level, new, currently not being treated with meds.    States also had MRI of left calf, with no clear findings.    Does not recall ruling out blood clot.    For aches and pains, has refrained from taking medications when taken takes ibuprofen 400 mg which provides some relief, finds ibuprofen to be more beneficial than Tylenol.    Denies any known family history for autoimmune diseases.    States is 5 foot 4 and 190 pounds.    Used to drink alcohol  beverages, discontinued about 4 months ago.  Used to have about 1-2 drinks 3-4 times per week day, 2-3 drinks over weekends.  Usually would drink a flavored beer or wine.    Difficult to tell at this time as to what is the primary source/etiology contributing to patient's symptoms described above.  Will obtain some labs and x-rays/tests to screen for possible underlying etiologies.      Pertinent rheumatology/past medical history (please refer to above for more detailed history):      Positive AMBROSE (1-160 with speckled pattern)    Left greater than right calf pain    Left greater than right knee pains    Bilateral hip/upper buttock pains    Hypermobile joints    History of shoulder dislocations    History right shoulder labral tear/repair    Overweight, mild    History anxiety, mild    Transaminitis    Elevated cholesterol    History of recurrent sinus infections    History of dry eyes       Rheumatology medications provided/suggested:    Flexeril      Pertinent medication from other providers or from otc (please refer to above for more detailed med list):    IUD  Vit D    Pertinent medications already tried:     Ibuprofen  Tylenol      Pertinent lab history:    Positive/elevated: AMBROSE, AST, ALT, cholesterol    Negative/unremarkable: ESR, CRP, rheumatoid factor, CCP antibody, TSH, Lyme antibody, CBC, vitamin D      Pertinent imaging/test history:          Other:    Marital status:       How many kids:  2    Type of work:  Yes,      Drinking alcohol: no, not for four months    Tobacco use: no    Recreational drug use: no    Active contraceptive: IUD     History hysterectomy: no     Tubal ligation: no     Partner vasectomy: no       Plan:      We will obtain ultrasound of left lower extremity, to rule out DVT.    Will obtain x-rays of: Bilateral knees, bilateral hips and SI joints.    We will add Flexeril to act as a muscle relaxant hopefully improve her sleep.    We discussed possibly adding Cymbalta, will  hold off at this time.    Takes ibuprofen 400 mg as needed with some benefit, prefers to avoid taking medication for pain relief.    Suggested she drink a sufficient amount of fluids daily, at least 8 cups of 8 ounces daily.    Avoid rigorous activities for now, particularly activities that bring on pain.    Given positive AMBROSE we will continue to monitor for any signs and symptoms consistent with having a connective tissue disease and manage accordingly.    Will obtain some labs and correlate clinically.    If work-up is unrevealing a consideration is having patient see vascular, given elevated cholesterol levels.    Follow-up in 4 to 5 weeks.      Procedure note:     Total time spent 65 minutes involved with patient care, includes placing orders, reviewing records and and formulating management plan.    Major side effect profile of medications provided/suggested were discussed with the patient.    This note was transcribed using Dragon voice recognition software as a result unintentional grammatical errors or word substitutions may have occurred. Please contact our Rheumatology department if you need any clarification or if you have any related inquiries.    Thank you for referring this patient to our clinic.      Johnny Serna DO   ....................  2/22/2021   8:20 AM

## 2021-06-15 NOTE — TELEPHONE ENCOUNTER
Pt notified of lab and xray results.     Pt states she did get scheduled with [facility] for the end of April- soonest they could get her in. If pt needs to be seen sooner, please call [facility] to move up apt.

## 2021-06-15 NOTE — TELEPHONE ENCOUNTER
In the interim, recommend patient recheck hepatic panel in 10 days time to monitor for stability. I will be unavailable from the end of March until the third week of April.  Hence recommend patient have labs no later than 10 days from now at a [facility] facility and should notify us after performing, so I can review results. If patient's symptoms worsen in my absence, recommend she contact her primary care physician.    Please place lab orders mentioned above.

## 2021-06-15 NOTE — TELEPHONE ENCOUNTER
Please see my comments associated with lab results and discuss with the patient.    Please mention to the patient referral to GI placed.  Recommend for now holding NSAIDs such as ibuprofen, Aleve etc. and Tylenol until clearance provided by gastroenterology.

## 2021-06-16 NOTE — TELEPHONE ENCOUNTER
Pt called asking to speak with RN or Dr scott some questions she has about the upcoming lab tests she is to have drawn.   Please contact pt at [phone].    Thank you

## 2021-06-16 NOTE — TELEPHONE ENCOUNTER
Pt wonders if you can order cholesterol labs as well due to having elevated LFTs, pt states her cholesterol was elevated in the past as well and wonders if they are all connected and to see what they are at. Pt has appt at [facility] on Thursday.     Please let us know if okay to order lipid panel?

## 2021-06-16 NOTE — TELEPHONE ENCOUNTER
Pt notified to have lab repeated in 7-10 days and of lab results. Pt would like to have labs drawn at [facility] as it is closer to her home. Lab order faxed to there at [phone]

## 2021-06-16 NOTE — TELEPHONE ENCOUNTER
Who is calling:  Pt  Reason for Call:  Pt is calling to see if the cholesterol lab had been order?  Pt states her clinic has not received any order for the Cholesterol lab.    Pt would like a call back.      Okay to leave a detailed message: Yes

## 2021-06-16 NOTE — TELEPHONE ENCOUNTER
Dr Kareem hartman'd ordering Lipid panel for pt, pt should fast for lab.     Pt notified and confirmed she was fasting when she had labs drawn this morning, they did draw a tube for lipids but need an order

## 2021-06-16 NOTE — TELEPHONE ENCOUNTER
"Left message for pt to call back    Also additional labs results  \"Negative/unremarkable: Double-stranded DNA antibody, Elysia 1 antibody, SSA/SSB antibodies, RNP antibody, Burns antibody, SCL-70 antibody, HLA-B27.     Prior labs that returned sooner, were already commented on. \"  "

## 2021-06-17 NOTE — PROGRESS NOTES
Melisa Burns who presents today with a chief complaint of  No chief complaint on file.      Joint Pains: yes  Location: left calf  Onset: a year  Intensity:  1-2/10  AM Stiffness: Minutes  Alleviating/Aggravating Factors: walking increase pain.   Tolerating Meds: yes  Other:      ROS:  Patient denies having any chest pain, shortness of breath, cough, abdominal pain, nausea, vomiting, rashes, fevers, oral ulcers and recent infections.  Patient admits to having a good appetite      Problem List:  There is no problem list on file for this patient.       PMH:   No past medical history on file.    Surgical History:  No past surgical history on file.    Family History:  No family history on file.    Social History:       Allergies:  Allergies   Allergen Reactions     Penicillin Itching and Rash        Current Medications:  Current Outpatient Medications   Medication Sig Dispense Refill     cyclobenzaprine (FLEXERIL) 10 MG tablet Take 1 tab p.o. qhs (if feeling groggy the following morning, can try taking half a tablet instead or can take earlier the night before). 30 tablet 2     multivit-min/ferrous fumarate (MULTI VITAMIN ORAL) Take by mouth.       No current facility-administered medications for this visit.            Physical Exam:  Following up today via video visit, per Covid-19 pandemic requirements.    Verbal consent has been obtained for this service by care team member.    Video call start time: 3:38 PM    Video call end time: 3:56 PM    [employer] utilized for video call.    Phone number utilized: [[phone]]    Patient location for video visit: Home     Provider location for video visit:  Home (working remotely)      Summary/Assessment:    History that includes arthralgias/myalgias, elevated liver enzymes, positive AMBROSE.    Presents for a follow-up visit.    States pains involving low back, hips and knees have improved however still has some left calf pain.  Right calf doing better.  Patient still not  running, however is exercising.    Flexeril ineffective.      Established with GI now due to elevated liver enzymes/bilirubin these levels have been improving with diet modification.  States has lost about 20 pounds, currently at about 180 pounds (believes was about 200 pounds as opposed to 190, by last visit).  States for now GI believes liver abnormalities may be related to fatty liver however has follow-up appointment with GI to reassess.  Adds GI not concerned by elevated total bili given that direct bili just minimally elevated.    Avoiding NSAIDs/analgesics, at this time.    Patient's cholesterol levels have also been improving with diet modification/losing weight.    Patient had extensive autoimmune work-up due to positive AMBROSE which was unremarkable.  ESR, CRP and CK levels were also within normal limits.    Ultrasound of left lower extremity was negative for DVT.    X-rays performed, see results below.     Difficult to tell at this time as to what the primary source is contributing to her chronic left calf pain.  Patient scheduled to see a chiropractor/PT practitioner.  Is avoiding medications at this time due to her liver abnormalities/work-up.    Although noted to have positive AMBROSE, patient currently does not display sufficient clinical evidence towards meeting criteria for having an active rheumatological disease, will continue to monitor with time.    Please see below for management plan.      From prior note: Presented on initial visit with complaints of having positive AMBROSE and left greater than right calf pain primarily going on for greater than 6 months.  Patient is a runner (for over 8 years), initially thought perhaps related to running however despite discontinuing his still been experiencing calf pains with increased activity, even just climbing stairs.  Has also noticed associated knee pains, left greater than right.    Most days drink sufficient amount of fluids daily however some days  not.    Denies having any joint swelling.    Admits to being hypermobile, has had shoulder dislocations in the past with right shoulder torn labrum repair.    Occasionally has buttock discomfort and hip pains going on since 2014.    Admits to being stressed/having component of anxiety, partly due to line of work as a .  Lately also feeling some depression with symptoms and Covid.  Patient works remotely.    Has already had work-up via chiropractor, orthopedics and pursue PT with no clear underlying etiology and no significant symptom relief.    With regards to positive AMBROSE 1-160 titer with speckled pattern.    Noted to have elevated liver enzymes.  Perhaps stemming from muscle.    Lately noted to have elevated cholesterol level, new, currently not being treated with meds.    States also had MRI of left calf, with no clear findings.    Does not recall ruling out blood clot.    For aches and pains, has refrained from taking medications when taken takes ibuprofen 400 mg which provides some relief, finds ibuprofen to be more beneficial than Tylenol.    Denies any known family history for autoimmune diseases.    States is 5 foot 4 and 190 pounds.    Used to drink alcohol beverages, discontinued about 4 months ago.  Used to have about 1-2 drinks 3-4 times per week day, 2-3 drinks over weekends.  Usually would drink a flavored beer or wine.    Difficult to tell at this time as to what is the primary source/etiology contributing to patient's symptoms described above.  Will obtain some labs and x-rays/tests to screen for possible underlying etiologies.      Pertinent rheumatology/past medical history (please refer to above for more detailed history):      Positive AMBROSE (1-160 with speckled pattern)    Left greater than right calf pain    Left greater than right knee pains    Bilateral hip/upper buttock pains    Hypermobile joints    History of shoulder dislocations    History right shoulder labral  "tear/repair    Overweight, mild    History anxiety, mild    Abnormal LFTs    Elevated cholesterol    History of recurrent sinus infections    History of dry eyes         Rheumatology medications provided/suggested:          Pertinent medication from other providers or from otc (please refer to above for more detailed med list):    IUD  Vit D    Pertinent medications already tried:     Ibuprofen  Tylenol  Flexeril (ineffective)    Pertinent lab history:    Positive/elevated: AMBROSE, AST, ALT, GGT, cholesterol    Negative/unremarkable: AMBROSE subsets, rheumatoid factor, CCP antibody, cardiolipin antibodies, lupus anticoagulant, HLA-B27, ESR, CRP, rheumatoid factor, CCP antibody, CBC, TSH, Lyme antibody, CBC, vitamin D, electrolytes, urinalysis, urine microalbumin, CK, hemoglobin A1c        Pertinent imaging/test history:    X-rays of pelvis/hips showed:  No acute fracture or malalignment. There is normal hip joint spacing bilaterally. Tiny chronic ossicle adjacent to the left acetabulum. Subchondral sclerosis along the sacroiliac joints bilaterally may represent mild degenerative change. Intrauterine device. Metallic densities project over the pelvis, may be external.(\"Per patient, felt to be secondary to metallic ends of sweatpants strings\")    X-rays of SI joints showed:  No acute fracture or malalignment. Subchondral sclerosis along the sacroiliac joints bilaterally may represent mild degenerative change. No erosions or ankylosis. Intrauterine device. Metallic density projecting over the right hemiabdomen, may be external.    X-rays of knees were unremarkable.    Ultrasound of left lower extremity was negative for DVT.    Other:    Marital status:       How many kids:  2    Type of work:  Yes,      Drinking alcohol: no, not for four months    Tobacco use: no    Recreational drug use: no    Active contraceptive: IUD     History hysterectomy: no     Tubal ligation: no     Partner vasectomy: no "       Plan:      Given mildly elevated AMBROSE, continue to monitor with time for any signs and symptoms consistent with having active/evolving rheumatological disease and manage accordingly.  Also suggested discussing elevated AMBROSE with GI to see if associated.    Continue following through with suggestions provided by GI.    Plans on seeing a chiropractic/PT practitioner for persistent left calf pain.    On a prior visit, we discussed possibly adding Cymbalta, will hold off at this time.    Continue drinking sufficient amount of fluids daily, at least 8 cups of 8 ounces daily.    Continue avoiding rigorous activities for now, particularly activities that bring on pain.    Given positive AMBROSE we will continue to monitor for any signs and symptoms consistent with having a connective tissue disease and manage accordingly.    Will obtain some labs and correlate clinically.    Made patient aware that if symptoms persist involving left lower extremity a consideration is seeing vascular, given history of elevated cholesterol levels.  Made aware patient can contact us for this referral.    Follow-up in 4 to 6 months. We will then consider repeating some labs given elevated AMBROSE (such as double-stranded DNA, ESR, CRP, CBC, creatinine, urinalysis etc.)      Procedure note:     Total time spent 55 minutes involved with patient care, includes placing orders, reviewing records and and formulating management plan.    This note was transcribed using Dragon voice recognition software as a result unintentional grammatical errors or word substitutions may have occurred. Please contact our Rheumatology department if you need any clarification or if you have any related inquiries.    Major side effect profile of medications provided were discussed with the patient.      Johnny Serna DO  ...................  4/28/2021   12:29 PM

## 2021-06-17 NOTE — PATIENT INSTRUCTIONS - HE
Summary of Your Rheumatology Visit    Next Appointment:   4-6 Months    Medications:      Referrals:      Tests:        Injections:      Other:    Recommend discussing elevated AMBROSE with a gastroenterologist.    If left lower extremity symptoms/calf pain persist or worsen, can contact us for referral to vascular.

## 2021-06-17 NOTE — TELEPHONE ENCOUNTER
Lvmtcb. Please give pt a call to schedule. Thank you      Next Appointment:   4-6 Months     Other:     Recommend discussing elevated AMBROSE with a gastroenterologist.     If left lower extremity symptoms/calf pain persist or worsen, can contact us for referral to vascular.

## 2021-06-25 NOTE — TELEPHONE ENCOUNTER
Lab results including abnormals were discussed with the patient during follow-up visit on April 28, 2021.

## 2021-07-14 ENCOUNTER — TRANSFERRED RECORDS (OUTPATIENT)
Dept: HEALTH INFORMATION MANAGEMENT | Facility: CLINIC | Age: 36
End: 2021-07-14

## 2021-09-14 ENCOUNTER — TELEPHONE (OUTPATIENT)
Dept: PHYSICAL MEDICINE AND REHAB | Facility: CLINIC | Age: 36
End: 2021-09-14

## 2021-09-14 ENCOUNTER — VIRTUAL VISIT (OUTPATIENT)
Dept: RHEUMATOLOGY | Facility: CLINIC | Age: 36
End: 2021-09-14
Payer: COMMERCIAL

## 2021-09-14 DIAGNOSIS — R79.89 ABNORMAL LIVER FUNCTION TESTS: ICD-10-CM

## 2021-09-14 DIAGNOSIS — M79.662 PAIN OF LEFT CALF: ICD-10-CM

## 2021-09-14 DIAGNOSIS — R76.8 ANA POSITIVE: Primary | ICD-10-CM

## 2021-09-14 PROCEDURE — 99214 OFFICE O/P EST MOD 30 MIN: CPT | Mod: 95 | Performed by: INTERNAL MEDICINE
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
## 2021-09-18 ENCOUNTER — HEALTH MAINTENANCE LETTER (OUTPATIENT)
Age: 36
End: 2021-09-18

## 2021-12-22 ENCOUNTER — LAB (OUTPATIENT)
Dept: LAB | Facility: CLINIC | Age: 36
End: 2021-12-22
Payer: COMMERCIAL

## 2021-12-22 DIAGNOSIS — R79.89 ELEVATED LFTS: ICD-10-CM

## 2021-12-22 DIAGNOSIS — R79.89 ABNORMAL LIVER FUNCTION TESTS: ICD-10-CM

## 2021-12-22 DIAGNOSIS — R76.8 ANA POSITIVE: ICD-10-CM

## 2021-12-22 DIAGNOSIS — M79.662 PAIN OF LEFT CALF: ICD-10-CM

## 2021-12-22 DIAGNOSIS — R74.01 TRANSAMINITIS: ICD-10-CM

## 2021-12-22 LAB
ALBUMIN SERPL-MCNC: 3.6 G/DL (ref 3.4–5)
ALBUMIN UR-MCNC: NEGATIVE MG/DL
ALP SERPL-CCNC: 26 U/L (ref 40–150)
ALT SERPL W P-5'-P-CCNC: 31 U/L (ref 0–50)
APPEARANCE UR: CLEAR
AST SERPL W P-5'-P-CCNC: 22 U/L (ref 0–45)
BASOPHILS # BLD AUTO: 0 10E3/UL (ref 0–0.2)
BASOPHILS NFR BLD AUTO: 1 %
BILIRUB DIRECT SERPL-MCNC: 0.3 MG/DL (ref 0–0.2)
BILIRUB SERPL-MCNC: 3.4 MG/DL (ref 0.2–1.3)
BILIRUB UR QL STRIP: NEGATIVE
CK SERPL-CCNC: 72 U/L (ref 30–225)
COLOR UR AUTO: YELLOW
CREAT SERPL-MCNC: 0.8 MG/DL (ref 0.52–1.04)
CRP SERPL-MCNC: <2.9 MG/L (ref 0–8)
EOSINOPHIL # BLD AUTO: 0.2 10E3/UL (ref 0–0.7)
EOSINOPHIL NFR BLD AUTO: 3 %
ERYTHROCYTE [DISTWIDTH] IN BLOOD BY AUTOMATED COUNT: 12.5 % (ref 10–15)
ERYTHROCYTE [SEDIMENTATION RATE] IN BLOOD BY WESTERGREN METHOD: 8 MM/HR (ref 0–20)
GFR SERPL CREATININE-BSD FRML MDRD: >90 ML/MIN/1.73M2
GLUCOSE UR STRIP-MCNC: NEGATIVE MG/DL
HCT VFR BLD AUTO: 37.3 % (ref 35–47)
HGB BLD-MCNC: 12.8 G/DL (ref 11.7–15.7)
HGB UR QL STRIP: NEGATIVE
IMM GRANULOCYTES # BLD: 0 10E3/UL
IMM GRANULOCYTES NFR BLD: 0 %
KETONES UR STRIP-MCNC: NEGATIVE MG/DL
LEUKOCYTE ESTERASE UR QL STRIP: NEGATIVE
LYMPHOCYTES # BLD AUTO: 1.6 10E3/UL (ref 0.8–5.3)
LYMPHOCYTES NFR BLD AUTO: 27 %
MCH RBC QN AUTO: 31.5 PG (ref 26.5–33)
MCHC RBC AUTO-ENTMCNC: 34.3 G/DL (ref 31.5–36.5)
MCV RBC AUTO: 92 FL (ref 78–100)
MONOCYTES # BLD AUTO: 0.4 10E3/UL (ref 0–1.3)
MONOCYTES NFR BLD AUTO: 8 %
NEUTROPHILS # BLD AUTO: 3.6 10E3/UL (ref 1.6–8.3)
NEUTROPHILS NFR BLD AUTO: 62 %
NITRATE UR QL: NEGATIVE
PH UR STRIP: 5.5 [PH] (ref 5–7)
PLATELET # BLD AUTO: 178 10E3/UL (ref 150–450)
PROT SERPL-MCNC: 6.9 G/DL (ref 6.8–8.8)
RBC # BLD AUTO: 4.06 10E6/UL (ref 3.8–5.2)
RBC #/AREA URNS AUTO: NORMAL /HPF
SP GR UR STRIP: <=1.005 (ref 1–1.03)
UROBILINOGEN UR STRIP-ACNC: 0.2 E.U./DL
WBC # BLD AUTO: 5.8 10E3/UL (ref 4–11)
WBC #/AREA URNS AUTO: NORMAL /HPF

## 2021-12-22 PROCEDURE — 86039 ANTINUCLEAR ANTIBODIES (ANA): CPT

## 2021-12-22 PROCEDURE — 82565 ASSAY OF CREATININE: CPT

## 2021-12-22 PROCEDURE — 86225 DNA ANTIBODY NATIVE: CPT

## 2021-12-22 PROCEDURE — 85025 COMPLETE CBC W/AUTO DIFF WBC: CPT

## 2021-12-22 PROCEDURE — 36415 COLL VENOUS BLD VENIPUNCTURE: CPT

## 2021-12-22 PROCEDURE — 86140 C-REACTIVE PROTEIN: CPT

## 2021-12-22 PROCEDURE — 80061 LIPID PANEL: CPT

## 2021-12-22 PROCEDURE — 86038 ANTINUCLEAR ANTIBODIES: CPT

## 2021-12-22 PROCEDURE — 82550 ASSAY OF CK (CPK): CPT

## 2021-12-22 PROCEDURE — 81001 URINALYSIS AUTO W/SCOPE: CPT

## 2021-12-22 PROCEDURE — 82043 UR ALBUMIN QUANTITATIVE: CPT

## 2021-12-22 PROCEDURE — 85652 RBC SED RATE AUTOMATED: CPT

## 2021-12-22 PROCEDURE — 80076 HEPATIC FUNCTION PANEL: CPT

## 2021-12-23 LAB
ANA PAT SER IF-IMP: ABNORMAL
ANA SER QL IF: ABNORMAL
ANA TITR SER IF: ABNORMAL {TITER}
CHOLEST SERPL-MCNC: 130 MG/DL
CREAT UR-MCNC: 39 MG/DL
DSDNA AB SER-ACNC: 0.7 IU/ML
FASTING STATUS PATIENT QL REPORTED: YES
HDLC SERPL-MCNC: 50 MG/DL
LDLC SERPL CALC-MCNC: 66 MG/DL
MICROALBUMIN UR-MCNC: 5 MG/L
MICROALBUMIN/CREAT UR: 12.82 MG/G CR (ref 0–25)
NONHDLC SERPL-MCNC: 80 MG/DL
TRIGL SERPL-MCNC: 68 MG/DL

## 2022-03-05 ENCOUNTER — HEALTH MAINTENANCE LETTER (OUTPATIENT)
Age: 37
End: 2022-03-05

## 2022-03-24 ENCOUNTER — TELEPHONE (OUTPATIENT)
Dept: FAMILY MEDICINE | Facility: CLINIC | Age: 37
End: 2022-03-24
Payer: COMMERCIAL

## 2022-03-24 NOTE — TELEPHONE ENCOUNTER
Needs of attention regarding:  -Wellness (Physical) Visit     Health Maintenance Topics with due status: Overdue       Topic Date Due    ADVANCE CARE PLANNING Never done    ANNUAL REVIEW OF HM ORDERS 12/18/2021    PHQ-2 (once per calendar year) 01/01/2022    PREVENTIVE CARE VISIT 02/01/2022       Communication:  See MyChart message

## 2022-04-19 NOTE — PATIENT INSTRUCTIONS
Summary of Your Rheumatology Visit    Next Appointment:  4-6  Months    Medications:      Referrals:    Physiatry    Tests:      Please have labs that were ordered performed.    Injections:      Other:        
Yes

## 2022-11-16 ENCOUNTER — OFFICE VISIT (OUTPATIENT)
Dept: FAMILY MEDICINE | Facility: CLINIC | Age: 37
End: 2022-11-16
Payer: COMMERCIAL

## 2022-11-16 VITALS
OXYGEN SATURATION: 100 % | DIASTOLIC BLOOD PRESSURE: 92 MMHG | BODY MASS INDEX: 32.44 KG/M2 | HEART RATE: 95 BPM | WEIGHT: 190 LBS | SYSTOLIC BLOOD PRESSURE: 124 MMHG | HEIGHT: 64 IN | TEMPERATURE: 101.7 F | RESPIRATION RATE: 18 BRPM

## 2022-11-16 DIAGNOSIS — Z20.828 EXPOSURE TO INFLUENZA: ICD-10-CM

## 2022-11-16 DIAGNOSIS — R50.9 FEVER, UNSPECIFIED FEVER CAUSE: Primary | ICD-10-CM

## 2022-11-16 DIAGNOSIS — J34.89 RHINORRHEA: ICD-10-CM

## 2022-11-16 DIAGNOSIS — R05.9 COUGH, UNSPECIFIED TYPE: ICD-10-CM

## 2022-11-16 DIAGNOSIS — R09.81 NASAL CONGESTION: ICD-10-CM

## 2022-11-16 LAB
FLUAV AG SPEC QL IA: POSITIVE
FLUBV AG SPEC QL IA: NEGATIVE

## 2022-11-16 PROCEDURE — 87804 INFLUENZA ASSAY W/OPTIC: CPT | Performed by: PHYSICIAN ASSISTANT

## 2022-11-16 PROCEDURE — U0005 INFEC AGEN DETEC AMPLI PROBE: HCPCS | Performed by: PHYSICIAN ASSISTANT

## 2022-11-16 PROCEDURE — U0003 INFECTIOUS AGENT DETECTION BY NUCLEIC ACID (DNA OR RNA); SEVERE ACUTE RESPIRATORY SYNDROME CORONAVIRUS 2 (SARS-COV-2) (CORONAVIRUS DISEASE [COVID-19]), AMPLIFIED PROBE TECHNIQUE, MAKING USE OF HIGH THROUGHPUT TECHNOLOGIES AS DESCRIBED BY CMS-2020-01-R: HCPCS | Performed by: PHYSICIAN ASSISTANT

## 2022-11-16 PROCEDURE — 99213 OFFICE O/P EST LOW 20 MIN: CPT | Mod: CS | Performed by: PHYSICIAN ASSISTANT

## 2022-11-16 NOTE — PROGRESS NOTES
Assessment & Plan     Melisa was seen today for fever.    Diagnoses and all orders for this visit:    Fever, unspecified fever cause  -     Influenza A & B Antigen - Clinic Collect  -     Symptomatic; Yes; 11/15/2022 COVID-19 Virus (Coronavirus) by PCR Nose    Exposure to influenza    Cough, unspecified type    Rhinorrhea    Nasal congestion    High clinical suspicion for influenza given constitution of sx and exposure to son with same. Lab behind and has not processed testing, but will inform pt once completed. Reviewed that treatment is supportive and encouraged rest, hydration and OTCs if needed. Reviewed hygiene measures and return to work instructions. Return for recheck with any red flags/complications. Patient in agreement with plan.       Return in about 1 month (around 12/16/2022) for Preventive Visit.    ALFA [facility]   Meilsa is a 37 year old, presenting for the following health issues:  Fever      History of Present Illness       Reason for visit:  Flu symptoms  Symptom onset:  1-3 days ago    She eats 2-3 servings of fruits and vegetables daily.She consumes 0 sweetened beverage(s) daily.She exercises with enough effort to increase her heart rate 30 to 60 minutes per day.  She exercises with enough effort to increase her heart rate 5 days per week.   She is taking medications regularly.         Acute Illness  Acute illness concerns: fever  Onset/Duration: x 1 day ago  Symptoms:  Fever: YES - max of 102.7,    Chills/Sweats: YES  Headache (location?): YES  Sinus Pressure: YES  Conjunctivitis:  YES  Ear Pain: no  Rhinorrhea: YES  Congestion: YES  Sore Throat: YES- maybe  Cough: YES-barking. Reports had COVID end of August/early September. Had residual cough since then, but worsened yesterday again.   Non-smoker, hx of asthma or COPD.  Wheeze: No  Decreased Appetite: YES  Nausea: No  Vomiting: No  Diarrhea: No  Dysuria/Freq.: No  Dysuria or  "Hematuria: No  Fatigue/Achiness: YES  Sick/Strep Exposure: YES- son has flu   Therapies tried and outcome: Cold and flu last night     Current Outpatient Medications   Medication     Ferrous Sulfate (IRON PO)     Multiple Vitamins-Minerals (MULTIVITAMIN ADULT PO)     Probiotic Product (PROBIOTIC DAILY PO)     VITAMIN D PO     No current facility-administered medications for this visit.        Allergies   Allergen Reactions     Shellfish-Derived Products Anaphylaxis     Penicillins      Childhood reaction unk, possibly rash.       Review of Systems   Constitutional, HEENT, cardiovascular, pulmonary, gi and gu systems are negative, except as otherwise noted.      Objective    BP (!) 124/92   Pulse 95   Temp (!) 101.7  F (38.7  C)   Resp 18   Ht 1.626 m (5' 4\")   Wt 86.2 kg (190 lb)   SpO2 100%   BMI 32.61 kg/m    Body mass index is 32.61 kg/m .  Physical Exam   GENERAL: healthy, alert and no distress  EYES: Eyes grossly normal to inspection, PERRL and conjunctivae and sclerae normal  HENT: ear canals and TM's normal, nose and mouth without ulcers or lesions  NECK: no adenopathy and no asymmetry, masses, or scars  RESP: lungs clear to auscultation - no rales, rhonchi or wheezes  CV: regular rates and rhythm and no murmur, click or rub                  "

## 2022-11-17 LAB — SARS-COV-2 RNA RESP QL NAA+PROBE: NEGATIVE

## 2022-11-20 ENCOUNTER — HEALTH MAINTENANCE LETTER (OUTPATIENT)
Age: 37
End: 2022-11-20

## 2022-12-01 ENCOUNTER — VIRTUAL VISIT (OUTPATIENT)
Dept: FAMILY MEDICINE | Facility: CLINIC | Age: 37
End: 2022-12-01
Payer: COMMERCIAL

## 2022-12-01 DIAGNOSIS — R05.9 COUGH, UNSPECIFIED TYPE: Primary | ICD-10-CM

## 2022-12-01 PROCEDURE — 99213 OFFICE O/P EST LOW 20 MIN: CPT | Mod: 95 | Performed by: FAMILY MEDICINE

## 2022-12-01 RX ORDER — PREDNISONE 20 MG/1
TABLET ORAL
Qty: 14 TABLET | Refills: 0 | Status: SHIPPED | OUTPATIENT
Start: 2022-12-01 | End: 2022-12-13

## 2022-12-01 NOTE — PROGRESS NOTES
Melisa is a 37 year old who is being evaluated via a billable telephone visit.      What phone number would you like to be contacted at? [phone]  How would you like to obtain your AVS? MyChart    Assessment & Plan   Cough, unspecified type  Persistent cough after initial COVID infection and then subsequent influenza infection.  We will try anti-inflammatory course tapering dose:  - predniSONE (DELTASONE) 20 MG tablet  Dispense: 14 tablet; Refill: 0      Return in about 3 weeks (around 12/22/2022) for symptoms failing to improve or sooner if worsening.      Jaylen Gaspar MD      [address] [address]  [email or web address]   Office: [phone]       Subjective   Melisa is a 37 year old, presenting for the following health issues:  Telephone    HPI     + for Covid back in August and + for Influenza A on 11/16/22  Still has cough and elevated HR ( as high as 175) when working out  ( has always been very athletic)     Review of Systems         Objective         Vitals:  No vitals were obtained today due to virtual visit.    Physical Exam   healthy, alert and no distress  PSYCH: Alert and oriented times 3; coherent speech, normal   rate and volume, able to articulate logical thoughts, able   to abstract reason, no tangential thoughts, no hallucinations   or delusions  Her affect is normal  RESP: No cough, no audible wheezing, able to talk in full sentences  Remainder of exam unable to be completed due to telephone visits      Phone call duration: 9 minutes

## 2023-02-01 ENCOUNTER — TRANSFERRED RECORDS (OUTPATIENT)
Dept: MULTI SPECIALTY CLINIC | Facility: CLINIC | Age: 38
End: 2023-02-01

## 2023-02-01 LAB — PAP SMEAR - HIM PATIENT REPORTED: NEGATIVE

## 2023-03-08 ENCOUNTER — OFFICE VISIT (OUTPATIENT)
Dept: FAMILY MEDICINE | Facility: CLINIC | Age: 38
End: 2023-03-08
Payer: COMMERCIAL

## 2023-03-08 VITALS
RESPIRATION RATE: 16 BRPM | SYSTOLIC BLOOD PRESSURE: 142 MMHG | BODY MASS INDEX: 33.63 KG/M2 | TEMPERATURE: 98.9 F | WEIGHT: 197 LBS | HEART RATE: 62 BPM | HEIGHT: 64 IN | OXYGEN SATURATION: 98 % | DIASTOLIC BLOOD PRESSURE: 98 MMHG

## 2023-03-08 DIAGNOSIS — R03.0 ELEVATED BLOOD PRESSURE READING WITHOUT DIAGNOSIS OF HYPERTENSION: ICD-10-CM

## 2023-03-08 DIAGNOSIS — R41.840 ATTENTION AND CONCENTRATION DEFICIT: Primary | ICD-10-CM

## 2023-03-08 DIAGNOSIS — E04.9 ENLARGEMENT OF THYROID: ICD-10-CM

## 2023-03-08 DIAGNOSIS — E04.2 MULTIPLE THYROID NODULES: ICD-10-CM

## 2023-03-08 LAB
ERYTHROCYTE [DISTWIDTH] IN BLOOD BY AUTOMATED COUNT: 12.1 % (ref 10–15)
HCT VFR BLD AUTO: 40.8 % (ref 35–47)
HGB BLD-MCNC: 14 G/DL (ref 11.7–15.7)
MCH RBC QN AUTO: 31.3 PG (ref 26.5–33)
MCHC RBC AUTO-ENTMCNC: 34.3 G/DL (ref 31.5–36.5)
MCV RBC AUTO: 91 FL (ref 78–100)
PLATELET # BLD AUTO: 209 10E3/UL (ref 150–450)
RBC # BLD AUTO: 4.47 10E6/UL (ref 3.8–5.2)
WBC # BLD AUTO: 6.2 10E3/UL (ref 4–11)

## 2023-03-08 PROCEDURE — 86376 MICROSOMAL ANTIBODY EACH: CPT | Performed by: FAMILY MEDICINE

## 2023-03-08 PROCEDURE — 84443 ASSAY THYROID STIM HORMONE: CPT | Performed by: FAMILY MEDICINE

## 2023-03-08 PROCEDURE — 80053 COMPREHEN METABOLIC PANEL: CPT | Performed by: FAMILY MEDICINE

## 2023-03-08 PROCEDURE — 85027 COMPLETE CBC AUTOMATED: CPT | Performed by: FAMILY MEDICINE

## 2023-03-08 PROCEDURE — 99214 OFFICE O/P EST MOD 30 MIN: CPT | Performed by: FAMILY MEDICINE

## 2023-03-08 PROCEDURE — 36415 COLL VENOUS BLD VENIPUNCTURE: CPT | Performed by: FAMILY MEDICINE

## 2023-03-08 PROCEDURE — 86800 THYROGLOBULIN ANTIBODY: CPT | Performed by: FAMILY MEDICINE

## 2023-03-08 PROCEDURE — 84480 ASSAY TRIIODOTHYRONINE (T3): CPT | Performed by: FAMILY MEDICINE

## 2023-03-08 ASSESSMENT — ANXIETY QUESTIONNAIRES
6. BECOMING EASILY ANNOYED OR IRRITABLE: MORE THAN HALF THE DAYS
4. TROUBLE RELAXING: SEVERAL DAYS
7. FEELING AFRAID AS IF SOMETHING AWFUL MIGHT HAPPEN: NOT AT ALL
8. IF YOU CHECKED OFF ANY PROBLEMS, HOW DIFFICULT HAVE THESE MADE IT FOR YOU TO DO YOUR WORK, TAKE CARE OF THINGS AT HOME, OR GET ALONG WITH OTHER PEOPLE?: NOT DIFFICULT AT ALL
GAD7 TOTAL SCORE: 8
7. FEELING AFRAID AS IF SOMETHING AWFUL MIGHT HAPPEN: NOT AT ALL
GAD7 TOTAL SCORE: 8
5. BEING SO RESTLESS THAT IT IS HARD TO SIT STILL: NOT AT ALL
IF YOU CHECKED OFF ANY PROBLEMS ON THIS QUESTIONNAIRE, HOW DIFFICULT HAVE THESE PROBLEMS MADE IT FOR YOU TO DO YOUR WORK, TAKE CARE OF THINGS AT HOME, OR GET ALONG WITH OTHER PEOPLE: NOT DIFFICULT AT ALL
1. FEELING NERVOUS, ANXIOUS, OR ON EDGE: MORE THAN HALF THE DAYS
GAD7 TOTAL SCORE: 8
3. WORRYING TOO MUCH ABOUT DIFFERENT THINGS: SEVERAL DAYS
2. NOT BEING ABLE TO STOP OR CONTROL WORRYING: MORE THAN HALF THE DAYS

## 2023-03-08 ASSESSMENT — PATIENT HEALTH QUESTIONNAIRE - PHQ9
SUM OF ALL RESPONSES TO PHQ QUESTIONS 1-9: 6
10. IF YOU CHECKED OFF ANY PROBLEMS, HOW DIFFICULT HAVE THESE PROBLEMS MADE IT FOR YOU TO DO YOUR WORK, TAKE CARE OF THINGS AT HOME, OR GET ALONG WITH OTHER PEOPLE: SOMEWHAT DIFFICULT
SUM OF ALL RESPONSES TO PHQ QUESTIONS 1-9: 6

## 2023-03-08 NOTE — PROGRESS NOTES
"  Assessment & Plan :       ICD-10-CM    1. Attention and concentration deficit  R41.840 Adult Neuropsychology Referral     Adult Mental Health  Referral      2. Elevated blood pressure reading without diagnosis of hypertension  R03.0 CBC with platelets     TSH with free T4 reflex     Comprehensive metabolic panel (BMP + Alb, Alk Phos, ALT, AST, Total. Bili, TP)     CBC with platelets     TSH with free T4 reflex     Comprehensive metabolic panel (BMP + Alb, Alk Phos, ALT, AST, Total. Bili, TP)      3. Enlargement of thyroid- with right sided nodularity - ? multiple with one medial > 1cm somewhat firm nodule palpated near the isthmus   E04.9 TSH with free T4 reflex     US Thyroid     Thyroid peroxidase antibody     Anti thyroglobulin antibody     T3, total     TSH with free T4 reflex     Thyroid peroxidase antibody     Anti thyroglobulin antibody     T3, total     Adult General Surg Referral      4. Multiple thyroid nodules >1.5 cm - solid - TiRads category 4 on US 3/10/2023   E04.2 Adult General Surg Referral      addendum 3/14/2023:  US thyroid showed  IMPRESSION: Multiple nodules seen throughout the thyroid gland, which are described above, all of which are all TI-RADS category 4. Follow-up FNA of nodules 1 and 3 is recommended, annual follow-up ultrasound for 5 years is recommended to monitor the   remaining nodules within the thyroid. - referred for FNA with [facility] - Dr. Felecia Bocanegra.       Ordering of each unique test  Prescription drug management  35 minutes spent on the date of the encounter doing chart review, history and exam, documentation and further activities per the note       BMI:   Estimated body mass index is 33.81 kg/m  as calculated from the following:    Height as of this encounter: 1.626 m (5' 4\").    Weight as of this encounter: 89.4 kg (197 lb).   Weight management plan: Discussed healthy diet and exercise guidelines    MEDICATIONS:   No orders of the defined types were " "placed in this encounter.         - Continue other medications without change  Work on weight loss  Regular exercise  See Patient Instructions    Return in about 2 months (around 5/8/2023) for Neuropsych or Mental Health for ADHD evaluation and treatment  .  Recheck BP with Dr. Geller in 1 month if BP's at home remain above 140/90.              Aniyah Geller MD  [facility] [place]        Subjective :   Melisa is a 38 year old, presenting for the following health issues:  ADHD concerns    Difficulty focusing with longer projects, easily distracted, \"time blindness\" , high anxiety, feels like her brain works differently than other peoples - really noticed when her son was undergoing eval for ADHD last year at age 14 and she noted similarities to her own experiences over the years.      Feels like she has 20 different tabs open in her brain at all times.     Got a \"crappy\" review at work on Friday. She works as a .  Generally likes what she does.  Works really late hours as she feels like she can't manage her time.  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         Hx of gestational hypertension and pre-eclampsia with now 14 yr old son.    Denies regular or binge alcohol use.       Can't try stimulants as a trial secondary to elevated BP.      History of Present Illness       Reason for visit:  Suspected ADHD  Symptom onset:  More than a month  Symptoms include:  Inability to focus, anxiety  Symptom intensity:  Moderate  Symptom progression:  Staying the same  Had these symptoms before:  Yes  Has tried/received treatment for these symptoms:  No    She eats 2-3 servings of fruits and vegetables daily.She consumes 0 sweetened beverage(s) daily.She exercises with enough effort to increase her heart rate 30 to 60 minutes per day.  She exercises with enough effort to increase her heart rate 5 days per week.   She is taking medications regularly.    Today's PHQ-9         PHQ-9 " "Total Score: 6    PHQ-9 Q9 Thoughts of better off dead/self-harm past 2 weeks :   Not at all    How difficult have these problems made it for you to do your work, take care of things at home, or get along with other people: Somewhat difficult  Today's MICAELA-7 Score: 8       Patient Active Problem List   Diagnosis     Anal fissure     Perianal irritation     Decreased rectal sphincter tone     Attention and concentration deficit     Enlargement of thyroid- with right sided nodularity - ? medial about 1cm cystic nodule palpated near the isthmus      Elevated blood pressure reading without diagnosis of hypertension       No current outpatient medications on file.          Allergies   Allergen Reactions     Shellfish-Derived Products Anaphylaxis     Penicillins      Childhood reaction unk, possibly rash.         Review of Systems :   Constitutional, HEENT, cardiovascular, pulmonary, GI, , musculoskeletal, neuro, skin, endocrine and psych systems are negative, except as otherwise noted.      Objective  :  BP (!) 142/98   Pulse 62   Temp 98.9  F (37.2  C)   Resp 16   Ht 1.626 m (5' 4\")   Wt 89.4 kg (197 lb)   SpO2 98%   BMI 33.81 kg/m    Body mass index is 33.81 kg/m .  Physical Exam :   GENERAL: healthy, overweight, alert and no distress  EYES: Eyes grossly normal to inspection, PERRL and conjunctivae and sclerae normal  HENT: ear canals and TM's normal, nose and mouth without ulcers or lesions  NECK: no adenopathy, no asymmetry, masses, or scars and Enlargement of thyroid- with right sided nodularity - ? medial one >1cm in diameter somewhat firm  nodule palpated near the isthmus   RESP: lungs clear to auscultation - no rales, rhonchi or wheezes  CV: regular rate and rhythm, normal S1 S2, no S3 or S4, no murmur, click or rub, no peripheral edema and peripheral pulses strong  ABDOMEN: soft, nontender, no hepatosplenomegaly, no masses and bowel sounds normal  MS: no gross musculoskeletal defects noted, no " edema  SKIN: no suspicious lesions or rashes  NEURO: Normal strength and tone, mentation intact and speech normal  PSYCH: mentation appears normal, affect normal/bright

## 2023-03-08 NOTE — Clinical Note
Please abstract the following data from this visit with this patient into the appropriate field in Epic:  Tests that can be patient reported without a hard copy:  Pap smear done on this date: 2/2023  (approximately), by this group: ob/gyn specialists , results were negative .   Other Tests found in the patient's chart through Chart Review/Care Everywhere:   Note to Abstraction: If this section is blank, no results were found via Chart Review/Care Everywhere.

## 2023-03-08 NOTE — PATIENT INSTRUCTIONS
" [facility]  [address] [place] [address]  Office: [phone]   Fax:    [phone]         Return in about 2 months (around 5/8/2023) for Physical/Preventative Visit, w/ Dr. DUQUE for 40 minute appointment.     Thank you so much or choosing [facility]  for your Health Care. It was a pleasure seeing you at your visit today! Please contact us with any questions or concerns you may have.                   Aniyah Geller MD                              To reach your [facility] care team after hours call:   [phone] press #2 \"to speak with your care team\".  This will get you to our clinic instead of routing to central [facility]  scheduling.     PLEASE NOTE OUR HOURS HAVE CHANGED secondary to COVID-19 coronavirus pandemic, as we are trying to minimize patient exposure to the virus,  which is now widespread in the [facility].  These hours may change with very little notice.  We apologize for any inconvenience.       Our current clinic hours are:          Monday- Thursday   7:00am - 6:00pm  in person.      Friday  7:00am- 5:00pm                       Saturday and Sunday : Closed to in person and virtual visits        We have telephone and virtual visit times available between    7:00am - 6pm on Monday-Friday as well.                                                Phone:  [phone]      Our pharmacy hours: Monday through Friday 8:00am to 5:00pm                        Saturday - 9:00 am to 12 noon       Sunday : Closed.              Phone:  [phone]              ###  Please note: at this time we are not accepting any walk-in visits. ###      There is also information available at our web site:  [email or web address]    If your provider ordered any lab tests and you do not receive the results within 10 business days, please call the clinic.    If you need a medication refill please contact your pharmacy.  Please allow " 3 business days for your refill to be completed.    Our clinic offers telephone visits and e visits.  Please ask one of your team members to explain more.      Use MyChart (secure email communication and access to your chart) to send your primary care provider a message or make an appointment. Ask someone on your Team how to sign up for imbookin ([email or web address])hart.

## 2023-03-09 LAB
ALBUMIN SERPL BCG-MCNC: 4.6 G/DL (ref 3.5–5.2)
ALP SERPL-CCNC: 34 U/L (ref 35–104)
ALT SERPL W P-5'-P-CCNC: 13 U/L (ref 10–35)
ANION GAP SERPL CALCULATED.3IONS-SCNC: 14 MMOL/L (ref 7–15)
AST SERPL W P-5'-P-CCNC: 29 U/L (ref 10–35)
BILIRUB SERPL-MCNC: 1.6 MG/DL
BUN SERPL-MCNC: 11.8 MG/DL (ref 6–20)
CALCIUM SERPL-MCNC: 9.5 MG/DL (ref 8.6–10)
CHLORIDE SERPL-SCNC: 102 MMOL/L (ref 98–107)
CREAT SERPL-MCNC: 0.84 MG/DL (ref 0.51–0.95)
DEPRECATED HCO3 PLAS-SCNC: 24 MMOL/L (ref 22–29)
GFR SERPL CREATININE-BSD FRML MDRD: 90 ML/MIN/1.73M2
GLUCOSE SERPL-MCNC: 89 MG/DL (ref 70–99)
POTASSIUM SERPL-SCNC: 4.1 MMOL/L (ref 3.4–5.3)
PROT SERPL-MCNC: 7.4 G/DL (ref 6.4–8.3)
SODIUM SERPL-SCNC: 140 MMOL/L (ref 136–145)
T3 SERPL-MCNC: 127 NG/DL (ref 85–202)
TSH SERPL DL<=0.005 MIU/L-ACNC: 0.85 UIU/ML (ref 0.3–4.2)

## 2023-03-10 ENCOUNTER — ANCILLARY PROCEDURE (OUTPATIENT)
Dept: ULTRASOUND IMAGING | Facility: CLINIC | Age: 38
End: 2023-03-10
Attending: FAMILY MEDICINE
Payer: COMMERCIAL

## 2023-03-10 DIAGNOSIS — E04.9 ENLARGEMENT OF THYROID: ICD-10-CM

## 2023-03-10 LAB — THYROPEROXIDASE AB SERPL-ACNC: 14 IU/ML

## 2023-03-10 PROCEDURE — 76536 US EXAM OF HEAD AND NECK: CPT

## 2023-03-13 ENCOUNTER — TELEPHONE (OUTPATIENT)
Dept: FAMILY MEDICINE | Facility: CLINIC | Age: 38
End: 2023-03-13
Payer: COMMERCIAL

## 2023-03-13 LAB — THYROGLOB AB SERPL IA-ACNC: <20 IU/ML

## 2023-03-13 NOTE — TELEPHONE ENCOUNTER
Order/Referral Request    Who is requesting:  Patient saw her ultrasound results and was explained she needs a fine needle biopsy.  Who does the referral ?    Orders being requested:  The biopsy from her ultrasound    Reason service is needed/diagnosis:  Questionable    When are orders needed by: asap    Has this been discussed with Provider: No    Does patient have an appointment scheduled?: No    Where to send orders: Place orders within Epic    Could we send this information to you in [facility] or would you prefer to receive a phone call?:   Patient would prefer a phone call   Okay to leave a detailed message?: Yes at Cell number on file:    Telephone Information:   Mobile [phone]

## 2023-03-13 NOTE — TELEPHONE ENCOUNTER
Pt calling in regarding results stated below, informed pt that she will be contacted once provider reviewed.     Latesha MCCARTNEY RN

## 2023-03-13 NOTE — TELEPHONE ENCOUNTER
See Thyroid US results from 3/10:     IMPRESSION: Multiple nodules seen throughout the thyroid gland, which are described above, all of which are all TI-RADS category 4. Follow-up FNA of nodules 1 and 3 is recommended, annual follow-up ultrasound for 5 years is recommended to monitor the   remaining nodules within the thyroid.    Routing to provider to review and advise.     Sondra Burns RN  [facility]

## 2023-03-16 ENCOUNTER — MYC MEDICAL ADVICE (OUTPATIENT)
Dept: FAMILY MEDICINE | Facility: CLINIC | Age: 38
End: 2023-03-16
Payer: COMMERCIAL

## 2023-03-17 NOTE — TELEPHONE ENCOUNTER
See MyChart and advise. MICAELA - 7 sent to On license of [facility] via [employer].    PHQ 3/8/2023   PHQ-9 Total Score 6   Q9: Thoughts of better off dead/self-harm past 2 weeks Not at all     ERENDIRA [facility]

## 2023-03-20 NOTE — TELEPHONE ENCOUNTER
Recommend video visit to discuss options for treatment  with patient.   Please offer pt appt this week.   Future Appointments 3/20/2023 - 9/16/2023      Date Visit Type Length Department Provider     3/21/2023  2:45 PM CONSULT 45 min [facility] SURGICAL CONSULT Felecia Bocanegra MD    Location Instructions:     Our office is located at [address] in [place].We are just East of [facility] in the [facility].When entering the building,please take the elevator to the 3rd Floor.When exiting the elevator,go to the Left.&nbsp; The clinic will be the last door on the Right in [address].

## 2023-03-21 ENCOUNTER — OFFICE VISIT (OUTPATIENT)
Dept: SURGERY | Facility: CLINIC | Age: 38
End: 2023-03-21
Payer: COMMERCIAL

## 2023-03-21 VITALS
DIASTOLIC BLOOD PRESSURE: 92 MMHG | OXYGEN SATURATION: 98 % | BODY MASS INDEX: 33.63 KG/M2 | HEIGHT: 64 IN | HEART RATE: 53 BPM | WEIGHT: 197 LBS | SYSTOLIC BLOOD PRESSURE: 142 MMHG | RESPIRATION RATE: 16 BRPM

## 2023-03-21 DIAGNOSIS — E04.1 THYROID NODULE: Primary | ICD-10-CM

## 2023-03-21 PROCEDURE — 99203 OFFICE O/P NEW LOW 30 MIN: CPT | Performed by: SURGERY

## 2023-03-21 ASSESSMENT — ANXIETY QUESTIONNAIRES
IF YOU CHECKED OFF ANY PROBLEMS ON THIS QUESTIONNAIRE, HOW DIFFICULT HAVE THESE PROBLEMS MADE IT FOR YOU TO DO YOUR WORK, TAKE CARE OF THINGS AT HOME, OR GET ALONG WITH OTHER PEOPLE: SOMEWHAT DIFFICULT
7. FEELING AFRAID AS IF SOMETHING AWFUL MIGHT HAPPEN: NOT AT ALL
GAD7 TOTAL SCORE: 8
6. BECOMING EASILY ANNOYED OR IRRITABLE: MORE THAN HALF THE DAYS
1. FEELING NERVOUS, ANXIOUS, OR ON EDGE: MORE THAN HALF THE DAYS
2. NOT BEING ABLE TO STOP OR CONTROL WORRYING: MORE THAN HALF THE DAYS
GAD7 TOTAL SCORE: 8
4. TROUBLE RELAXING: SEVERAL DAYS
3. WORRYING TOO MUCH ABOUT DIFFERENT THINGS: SEVERAL DAYS
5. BEING SO RESTLESS THAT IT IS HARD TO SIT STILL: NOT AT ALL
7. FEELING AFRAID AS IF SOMETHING AWFUL MIGHT HAPPEN: NOT AT ALL
8. IF YOU CHECKED OFF ANY PROBLEMS, HOW DIFFICULT HAVE THESE MADE IT FOR YOU TO DO YOUR WORK, TAKE CARE OF THINGS AT HOME, OR GET ALONG WITH OTHER PEOPLE?: SOMEWHAT DIFFICULT

## 2023-03-21 NOTE — TELEPHONE ENCOUNTER
MICAELA-7 SCORE 1/19/2012 3/8/2023 3/21/2023   Total Score 2 - -   Total Score - 8 (mild anxiety) 8 (mild anxiety)   Total Score - 8 8     Patient scheduled for video visit to discuss options.
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
## 2023-03-21 NOTE — PATIENT INSTRUCTIONS
ULTRASOUND THYROID BIOPSY     Date: 3/22/2023  Time: 2:00 PM   Location: [facility] [address] [place]  [address]       Please check in at 1:45 PM

## 2023-03-22 ENCOUNTER — HOSPITAL ENCOUNTER (OUTPATIENT)
Dept: ULTRASOUND IMAGING | Facility: CLINIC | Age: 38
Discharge: HOME | End: 2023-03-22
Attending: SURGERY | Admitting: SURGERY
Payer: COMMERCIAL

## 2023-03-22 DIAGNOSIS — E04.1 THYROID NODULE: ICD-10-CM

## 2023-03-22 PROCEDURE — 250N000009 HC RX 250: Performed by: RADIOLOGY

## 2023-03-22 PROCEDURE — 10006 FNA BX W/US GDN EA ADDL: CPT

## 2023-03-22 PROCEDURE — 88173 CYTOPATH EVAL FNA REPORT: CPT | Mod: TC,59 | Performed by: SURGERY

## 2023-03-22 PROCEDURE — 88173 CYTOPATH EVAL FNA REPORT: CPT | Mod: 26 | Performed by: PATHOLOGY

## 2023-03-22 RX ADMIN — LIDOCAINE HYDROCHLORIDE 10 ML: 10 INJECTION, SOLUTION EPIDURAL; INFILTRATION; INTRACAUDAL; PERINEURAL at 14:33

## 2023-03-22 NOTE — PROGRESS NOTES
Right thyroid biopsy nodules #1 and #3 completed per Dr. Dodge without difficulty, patient tolerated well. All discharge criteria were met and patient discharged to home ambulatory by self in stable condition.

## 2023-03-24 LAB
PATH REPORT.COMMENTS IMP SPEC: NORMAL
PATH REPORT.COMMENTS IMP SPEC: NORMAL
PATH REPORT.FINAL DX SPEC: NORMAL
PATH REPORT.GROSS SPEC: NORMAL
PATH REPORT.MICROSCOPIC SPEC OTHER STN: NORMAL

## 2023-03-28 ENCOUNTER — VIRTUAL VISIT (OUTPATIENT)
Dept: FAMILY MEDICINE | Facility: CLINIC | Age: 38
End: 2023-03-28
Payer: COMMERCIAL

## 2023-03-28 DIAGNOSIS — E04.2 MULTINODULAR THYROID: ICD-10-CM

## 2023-03-28 DIAGNOSIS — F41.9 ANXIETY: Primary | ICD-10-CM

## 2023-03-28 PROCEDURE — 99214 OFFICE O/P EST MOD 30 MIN: CPT | Mod: VID | Performed by: FAMILY MEDICINE

## 2023-03-28 RX ORDER — ESCITALOPRAM OXALATE 5 MG/1
5 TABLET ORAL DAILY
Qty: 30 TABLET | Refills: 1 | Status: SHIPPED | OUTPATIENT
Start: 2023-03-28 | End: 2023-05-25

## 2023-03-28 ASSESSMENT — PATIENT HEALTH QUESTIONNAIRE - PHQ9
10. IF YOU CHECKED OFF ANY PROBLEMS, HOW DIFFICULT HAVE THESE PROBLEMS MADE IT FOR YOU TO DO YOUR WORK, TAKE CARE OF THINGS AT HOME, OR GET ALONG WITH OTHER PEOPLE: NOT DIFFICULT AT ALL
SUM OF ALL RESPONSES TO PHQ QUESTIONS 1-9: 4
SUM OF ALL RESPONSES TO PHQ QUESTIONS 1-9: 4

## 2023-03-28 NOTE — PATIENT INSTRUCTIONS
[facility]  [address] [place] [address]  Office: [phone]   Fax:    [phone]     Mental health referral placed. Counseling coverage is very insurance driven. An intake person will call pt , but they  can narrow down possible choices by going to [employer].  In the search field type in therapists or counselors in the the [place] metro area and a bunch of therapists choices will come up with their pictures, backrounds, education and their areas of expertise.       Future Appointments 3/28/2023 - 9/24/2023        Date Visit Type Length Department Provider     4/25/2023 11:20 AM OFFICE VISIT 40 min [facility] Aniyah Geller MD    Location Instructions:     [facility] is located at [address] [place] [address], along [address] Free parking is available; access the lot by turning north from [address] onto [address], then west onto [address] [facility] [address].

## 2023-03-28 NOTE — PROGRESS NOTES
Melisa is a 38 year old who is being evaluated via a billable video visit.      How would you like to obtain your AVS? MyChart  If the video visit is dropped, the invitation should be resent by: Text to cell phone: [phone]  Will anyone else be joining your video visit? No        Assessment & Plan       ICD-10-CM    1. Anxiety  F41.9 escitalopram (LEXAPRO) 5 MG tablet     Adult Mental Health  Referral      2. Multinodular thyroid - FNA's = benign. needs US follow up once a year in March for 5 years - next US in 2024   E04.2           Return in 4 weeks (on 4/25/2023) for anxiety follow up, w/ Dr. DUQUE  as a video visit.   Future Appointments 3/28/2023 - 9/24/2023      Date Visit Type Length Department Provider     4/25/2023 11:20 AM OFFICE VISIT 40 min  FAMILY PRACTICE Aniyah Geller MD    Location Instructions:     [facility] is located at [address] [place] [address], along [address]. Free parking is available; access the lot by [address] onto [facility], then west onto [facility].                  Ordering of each unique test  Prescription drug management  25 minutes spent by me on the date of the encounter doing chart review, history and exam, documentation and further activities per the note       MEDICATIONS:   Orders Placed This Encounter   Medications     escitalopram (LEXAPRO) 5 MG tablet     Sig: Take 1 tablet (5 mg) by mouth daily     Dispense:  30 tablet     Refill:  1          - Continue other medications without change  Work on weight loss  Regular exercise  See Patient Instructions          Aniyah Geller MD  [facility]    Wen Vincent is a 38 year old, presenting for the following health issues:  No chief complaint on file.  No flowsheet data found.  History of Present Illness       She eats 2-3 servings of fruits and vegetables daily.She consumes 1 sweetened beverage(s) daily.She  exercises with enough effort to increase her heart rate 30 to 60 minutes per day.  She exercises with enough effort to increase her heart rate 5 days per week.   She is taking medications regularly.    Today's PHQ-9         PHQ-9 Total Score: 4    PHQ-9 Q9 Thoughts of better off dead/self-harm past 2 weeks :   Not at all    How difficult have these problems made it for you to do your work, take care of things at home, or get along with other people: Not difficult at all       Anxiety Follow-Up    How are you doing with your anxiety since your last visit? Worsened     Are you having other symptoms that might be associated with anxiety? No    Have you had a significant life event? No     Are you feeling depressed? No    Do you have any concerns with your use of alcohol or other drugs? No    Social History     Tobacco Use     Smoking status: Never     Smokeless tobacco: Never   Substance Use Topics     Alcohol use: Not Currently     Comment: 2 drinks on the weekends occasionally      Drug use: No     Comment: no herbal meds except for fish oil      MICAELA-7 SCORE 1/19/2012 3/8/2023 3/21/2023   Total Score 2 - -   Total Score - 8 (mild anxiety) 8 (mild anxiety)   Total Score - 8 8     PHQ 3/8/2023   PHQ-9 Total Score 6   Q9: Thoughts of better off dead/self-harm past 2 weeks Not at all     Last PHQ-9 3/28/2023   1.  Little interest or pleasure in doing things 0   2.  Feeling down, depressed, or hopeless 0   3.  Trouble falling or staying asleep, or sleeping too much 1   4.  Feeling tired or having little energy 1   5.  Poor appetite or overeating 1   6.  Feeling bad about yourself 0   7.  Trouble concentrating 1   8.  Moving slowly or restless 0   Q9: Thoughts of better off dead/self-harm past 2 weeks 0   PHQ-9 Total Score 4     MICAELA-7  3/21/2023   1. Feeling nervous, anxious, or on edge 2   2. Not being able to stop or control worrying 2   3. Worrying too much about different things 1   4. Trouble relaxing 1   5. Being so  "restless that it is hard to sit still 0   6. Becoming easily annoyed or irritable 2   7. Feeling afraid, as if something awful might happen 0   MICAELA-7 Total Score 8   If you checked any problems, how difficult have they made it for you to do your work, take care of things at home, or get along with other people? Somewhat difficult     Has difficulty falling asleep and staying asleep.  Has a tendency to hyperfocus on things.    Not taking any herbal medications.      Brothers have been on medication for anxiety in the past - she doesn't know what they have been on.     She had a visit with Dr. Felecia Bocanegra for thyroid nodules - FNA's = benign.   US once a year for  5 years .      Estimated body mass index is 33.81 kg/m  as calculated from the following:    Height as of 3/21/23: 1.626 m (5' 4\").    Weight as of 3/21/23: 89.4 kg (197 lb).     Review of Systems :   Constitutional, HEENT, cardiovascular, pulmonary, GI, , musculoskeletal, neuro, skin, endocrine and psych systems are negative, except as otherwise noted.      Objective       Vitals:  No vitals were obtained today due to virtual visit.    Physical Exam :   GENERAL: Healthy, alert and no distress  EYES: Eyes grossly normal to inspection.  No discharge or erythema, or obvious scleral/conjunctival abnormalities.  RESP: No audible wheeze, cough, or visible cyanosis.  No visible retractions or increased work of breathing.    SKIN: Visible skin clear. No significant rash, abnormal pigmentation or lesions.  NEURO: Cranial nerves grossly intact.  Mentation and speech appropriate for age.  PSYCH: Mentation appears normal, affect normal/bright, judgement and insight intact, normal speech and appearance well-groomed.    Hospital Outpatient Visit on 03/22/2023   Component Date Value Ref Range Status     Final Diagnosis 03/22/2023    Final                    Value:This result contains rich text formatting which cannot be displayed here.     Comment 03/22/2023    Final "                    Value:This result contains rich text formatting which cannot be displayed here.     Gross Description 03/22/2023    Final                    Value:This result contains rich text formatting which cannot be displayed here.     Microscopic Description 03/22/2023    Final                    Value:This result contains rich text formatting which cannot be displayed here.     Performing Labs 03/22/2023    Final                    Value:This result contains rich text formatting which cannot be displayed here.               Video-Visit Details:     Type of service:  Video Visit   Start time: 12:21pm   Stop time: 12:41pm   Originating Location (pt. Location): Home  Distant Location (provider location):  On-site  Platform used for Video Visit: [facility]    Answers for HPI/ROS submitted by the patient on 3/21/2023  MICAELA 7 TOTAL SCORE: 8

## 2023-04-15 ENCOUNTER — HEALTH MAINTENANCE LETTER (OUTPATIENT)
Age: 38
End: 2023-04-15

## 2023-04-23 NOTE — RESULT ENCOUNTER NOTE
Dear Melisa,      Your recent test results are noted below:    Covid19 test was negative/normal.    For additional lab test information, [email or web address] is an excellent reference. Please contact the clinic at [phone] with any further questions or concerns.    Sincerely,      Susanne Vail PA-C  [facility]
Dear Melisa,      Your recent test results are noted below:    Influenza A test was positive as you suspected. Treatment is supportive as reviewed. I hope you start to feel better soon.    For additional lab test information, [email or web address] is an excellent reference. Please contact the clinic at [phone] with any further questions or concerns.    Sincerely,      Susanne Vail PA-C  [facility]
1

## 2023-05-24 NOTE — RESULT ENCOUNTER NOTE
Dear Nino Vincent for not getting this result note with my comments to you sooner.     All your recent labs are normal, improved, or pretty stable from previous.     Please, continue your current medications and/or supplements and follow up as we discussed at your last visit.     For additional lab test information, [email or web address] is an excellent reference.    Thank you so much for choosing [facility].  Please contact us with any questions that you may have.   We appreciate the opportunity to serve you now and look forward to supporting your healthcare needs for a long time to come!    Most Sincerely,     Aniyah Geller MD

## 2023-05-25 ENCOUNTER — VIRTUAL VISIT (OUTPATIENT)
Dept: FAMILY MEDICINE | Facility: CLINIC | Age: 38
End: 2023-05-25
Payer: COMMERCIAL

## 2023-05-25 DIAGNOSIS — F41.9 ANXIETY: ICD-10-CM

## 2023-05-25 DIAGNOSIS — E04.2 MULTINODULAR THYROID: ICD-10-CM

## 2023-05-25 DIAGNOSIS — F43.23 ADJUSTMENT DISORDER WITH MIXED ANXIETY AND DEPRESSED MOOD: Primary | ICD-10-CM

## 2023-05-25 PROBLEM — N60.12 FIBROCYSTIC BREAST CHANGES OF BOTH BREASTS: Status: ACTIVE | Noted: 2023-05-25

## 2023-05-25 PROBLEM — N60.11 FIBROCYSTIC BREAST CHANGES OF BOTH BREASTS: Status: ACTIVE | Noted: 2023-05-25

## 2023-05-25 PROCEDURE — 99214 OFFICE O/P EST MOD 30 MIN: CPT | Mod: VID | Performed by: FAMILY MEDICINE

## 2023-05-25 RX ORDER — ESCITALOPRAM OXALATE 10 MG/1
10 TABLET ORAL DAILY
Qty: 90 TABLET | Refills: 1 | Status: SHIPPED | OUTPATIENT
Start: 2023-05-25 | End: 2023-08-25

## 2023-05-25 ASSESSMENT — ANXIETY QUESTIONNAIRES
IF YOU CHECKED OFF ANY PROBLEMS ON THIS QUESTIONNAIRE, HOW DIFFICULT HAVE THESE PROBLEMS MADE IT FOR YOU TO DO YOUR WORK, TAKE CARE OF THINGS AT HOME, OR GET ALONG WITH OTHER PEOPLE: SOMEWHAT DIFFICULT
GAD7 TOTAL SCORE: 5
7. FEELING AFRAID AS IF SOMETHING AWFUL MIGHT HAPPEN: NOT AT ALL
8. IF YOU CHECKED OFF ANY PROBLEMS, HOW DIFFICULT HAVE THESE MADE IT FOR YOU TO DO YOUR WORK, TAKE CARE OF THINGS AT HOME, OR GET ALONG WITH OTHER PEOPLE?: SOMEWHAT DIFFICULT
1. FEELING NERVOUS, ANXIOUS, OR ON EDGE: SEVERAL DAYS
GAD7 TOTAL SCORE: 5
6. BECOMING EASILY ANNOYED OR IRRITABLE: SEVERAL DAYS
7. FEELING AFRAID AS IF SOMETHING AWFUL MIGHT HAPPEN: NOT AT ALL
2. NOT BEING ABLE TO STOP OR CONTROL WORRYING: SEVERAL DAYS
GAD7 TOTAL SCORE: 5
3. WORRYING TOO MUCH ABOUT DIFFERENT THINGS: SEVERAL DAYS
5. BEING SO RESTLESS THAT IT IS HARD TO SIT STILL: NOT AT ALL
4. TROUBLE RELAXING: SEVERAL DAYS

## 2023-05-25 ASSESSMENT — PATIENT HEALTH QUESTIONNAIRE - PHQ9
10. IF YOU CHECKED OFF ANY PROBLEMS, HOW DIFFICULT HAVE THESE PROBLEMS MADE IT FOR YOU TO DO YOUR WORK, TAKE CARE OF THINGS AT HOME, OR GET ALONG WITH OTHER PEOPLE: SOMEWHAT DIFFICULT
SUM OF ALL RESPONSES TO PHQ QUESTIONS 1-9: 5
SUM OF ALL RESPONSES TO PHQ QUESTIONS 1-9: 5

## 2023-05-25 NOTE — PATIENT INSTRUCTIONS
[facility]  [address] [place] [address]  Office: [phone]   Fax:    [phone]       Return in about 3 months (around 8/25/2023) for depression/anxiety, as a video visit, w/ Dr Geller.     Future Appointments 5/25/2023 - 11/21/2023        Date Visit Type Length Department Provider     8/25/2023 11:20 AM OFFICE VISIT 40 min  FAMILY PRACTICE Aniyah Geller MD    Location Instructions:     [facility] is located at [address], [address]. Free parking is available; access the lot by turning north from [address] onto [address], then west onto [address] [facility].                    Start walking for exercise and stress relief - If walking outside,   - rain or shine - walk a block, then come home, next day walk   2 blocks , then come home ; and then add a block further from home daily - work up to 30-45minutes daily or 3-4x/week - or can work  up to  3-4 miles briskly daily.       If walking on treadmill or at  the mall, start with 5 minutes first day, then 6 minutes next day , then 7 minutes next day, then 8 minutes next day, etc.   Gradually work up to the above goals.  No stopping.      Can also try marching in place - try to have your knees come up as high to your chest as possible.  Start with 1-2 minutes at a time, and gradually add 30-60 seconds each workout. Try to work up to 15-20 minutes of walking/marching in place  Daily - great for during those days, when you are not comfortable walking outside.             Thank you so much for doing a video visit with us today. And, again, thank you  for choosing our [facility].  Please contact us with any questions that you may have.   We appreciate the opportunity to serve you now and look forward to supporting your healthcare needs for a long time to come!    Our clinic for the foreseeable future and until further notice , will  "continue to offer virtual visits, including telephone visits, video visits,  and e-visits, in addition to in person visits,  especially during this period of COVID-19 coronavirus pandemic.  Please call to schedule another one if you need it!        Most Sincerely,     Aniyah Geller MD                                              To reach your [facility] care team after hours call:   [phone] press #2 \"to speak with your care team\".  This will get you to our clinic instead of routing to central [facility]  scheduling.     PLEASE NOTE OUR HOURS HAVE CHANGED secondary to COVID-19 coronavirus pandemic, as we are trying to minimize patient exposure to the virus,  which is now widespread in the [facility].  These hours may change with very little notice.  We apologize for any inconvenience.       Our current clinic hours are:         Monday- Thursday   7:00am - 6:00pm  in person.      Friday  7:00am- 5:00pm in person                       Saturday and Sunday : Closed to in person and virtual visits            We have telephone and virtual visit times available between 7:00am - 6pm on             Monday-Friday as well.                                                Phone:  [phone]    Our pharmacy hours: Monday  through Friday 8:00am to 5:00pm                        Saturday - 9:00 am to 12 noon         Sunday : Closed.     ###  Please note: at this time we are not accepting any walk-in visits. ###      There is also information available at our web site:  [email or web address]    If your provider ordered any lab tests and you do not receive the results within 10 business days, please call the clinic.    If you need a medication refill please contact your pharmacy.  Please allow 3 business days for your refill to be completed.    Our clinic offers telephone visits and e visits.  Please ask one of your team members to explain more.      Use [employer] (secure email communication and " access to your chart) to send your primary care provider a message or make an appointment. Ask someone on your Team how to sign up for [facility].

## 2023-05-25 NOTE — PROGRESS NOTES
Melisa is a 38 year old who is being evaluated via a billable video visit.      How would you like to obtain your AVS? MyChart  If the video visit is dropped, the invitation should be resent by: Text to cell phone: [phone]  Will anyone else be joining your video visit? No        Assessment & Plan :       ICD-10-CM    1. Adjustment disorder with mixed anxiety and depressed mood- daughter Mini's cancer scare, work stress, her previous cancer-scare (5 thyroid nodules) - all her bx's negative   F43.23 escitalopram (LEXAPRO) 10 MG tablet      2. Anxiety  F41.9 escitalopram (LEXAPRO) 10 MG tablet      3. Multinodular thyroid- biopsies benign - needs annual thyroid US for follow up - next one 3/2024 per endocrinology   E04.2          Increase lexapro from 5mg daily to 10mg po daily and try to get some fresh air and walk daily - even for 10-15 minutes to help clear your head.     Make appt with new therapist as discussed.       Ordering of each unique test  Prescription drug management  23 minutes spent by me on the date of the encounter doing chart review, history and exam, documentation and further activities per the note       MEDICATIONS:   Orders Placed This Encounter   Medications     escitalopram (LEXAPRO) 10 MG tablet     Sig: Take 1 tablet (10 mg) by mouth daily     Dispense:  90 tablet     Refill:  1          - Continue other medications without change  Regular exercise  See Patient Instructions         Aniyah Geller MD  [facility]   Melisa is a 38 year old, presenting for the following health issues:  Recheck Medication        5/25/2023     3:12 PM   Additional Questions   Roomed by lior castillo         5/25/2023     3:12 PM   Patient Reported Additional Medications   Patient reports taking the following new medications none     History of Present Illness       Mental Health Follow-up:  Patient presents to follow-up on Depression & Anxiety.Patient's depression  since last visit has been:  Medium  The patient is not having other symptoms associated with depression.  Patient's anxiety since last visit has been:  No change  The patient is not having other symptoms associated with anxiety.  Any significant life events: job concerns and health concerns  Patient is feeling anxious or having panic attacks.  Patient has no concerns about alcohol or drug use.    She eats 2-3 servings of fruits and vegetables daily.She consumes 1 sweetened beverage(s) daily.She exercises with enough effort to increase her heart rate 20 to 29 minutes per day.  She exercises with enough effort to increase her heart rate 4 days per week.   She is taking medications regularly.    Today's PHQ-9         PHQ-9 Total Score: 5    PHQ-9 Q9 Thoughts of better off dead/self-harm past 2 weeks :   Not at all    How difficult have these problems made it for you to do your work, take care of things at home, or get along with other people: Somewhat difficult  Today's MICAELA-7 Score: 5     Not doing great.   Since our last visit on 3/28/2023, Daughter Mini, 11 yrs. Old - had a biopsy of her femur - turned out to be a stress fracture.  Had to go to [facility].  Had to see an Orthopedic oncologist . Now healing.    Then went into a hellish period at work.   Made an appointment with a new therapist.   Having some tummy troubles - didn't start right away when she started taking the lexapro, but rather with the increased stress in her life.  Having some IBS-D - her diet has deteriorated - eating more for convenience rather than anything.     Despite the above, she'd like to increase her dose to 10mg on the lexapro from 5mg daily.  Having a lot of problems unwinding.      Long-standing problems with sleep - difficulty falling asleep, staying asleep is fine unless her  is snoring really badly.      She rechecked her BP at home = 130/80's.          3/8/2023     3:04 PM 3/28/2023    11:27 AM 5/25/2023     3:08 PM   PHQ    PHQ-9 Total Score 6 4 5   Q9: Thoughts of better off dead/self-harm past 2 weeks Not at all Not at all Not at all          3/8/2023     3:05 PM 3/21/2023     3:01 PM 5/25/2023     3:09 PM   MICAELA-7 SCORE   Total Score 8 (mild anxiety) 8 (mild anxiety) 5 (mild anxiety)   Total Score 8 8 5         5/25/2023     3:08 PM   Last PHQ-9   1.  Little interest or pleasure in doing things 1   2.  Feeling down, depressed, or hopeless 1   3.  Trouble falling or staying asleep, or sleeping too much 1   4.  Feeling tired or having little energy 1   5.  Poor appetite or overeating 0   6.  Feeling bad about yourself 0   7.  Trouble concentrating 1   8.  Moving slowly or restless 0   Q9: Thoughts of better off dead/self-harm past 2 weeks 0   PHQ-9 Total Score 5         5/25/2023     3:09 PM   MICAELA-7    1. Feeling nervous, anxious, or on edge 1   2. Not being able to stop or control worrying 1   3. Worrying too much about different things 1   4. Trouble relaxing 1   5. Being so restless that it is hard to sit still 0   6. Becoming easily annoyed or irritable 1   7. Feeling afraid, as if something awful might happen 0   MICAELA-7 Total Score 5   If you checked any problems, how difficult have they made it for you to do your work, take care of things at home, or get along with other people? Somewhat difficult       Patient Active Problem List   Diagnosis     Anal fissure     Perianal irritation     Decreased rectal sphincter tone     Attention and concentration deficit     Enlargement of thyroid- with right sided nodularity - ? medial about 1cm cystic nodule palpated near the isthmus      Elevated blood pressure reading without diagnosis of hypertension       Current Outpatient Medications   Medication Sig Dispense Refill     escitalopram (LEXAPRO) 5 MG tablet Take 1 tablet (5 mg) by mouth daily 30 tablet 1          Allergies   Allergen Reactions     Shellfish-Derived Products Anaphylaxis     Penicillins      Childhood reaction unk,  possibly rash.         Review of Systems:   Constitutional, HEENT, cardiovascular, pulmonary, GI, , musculoskeletal, neuro, skin, endocrine and psych systems are negative, except as otherwise noted.      Objective           Vitals:  No vitals were obtained today due to virtual visit.    Physical Exam   GENERAL: Healthy, alert and no distress  EYES: Eyes grossly normal to inspection.  No discharge or erythema, or obvious scleral/conjunctival abnormalities.  RESP: No audible wheeze, cough, or visible cyanosis.  No visible retractions or increased work of breathing.    SKIN: Visible skin clear. No significant rash, abnormal pigmentation or lesions.  NEURO: Cranial nerves grossly intact.  Mentation and speech appropriate for age.  PSYCH: Mentation appears normal, affect normal/bright, judgement and insight intact, normal speech and appearance well-groomed.    Hospital Outpatient Visit on 03/22/2023   Component Date Value Ref Range Status     Final Diagnosis 03/22/2023    Final                    Value:This result contains rich text formatting which cannot be displayed here.     Comment 03/22/2023    Final                    Value:This result contains rich text formatting which cannot be displayed here.     Gross Description 03/22/2023    Final                    Value:This result contains rich text formatting which cannot be displayed here.     Microscopic Description 03/22/2023    Final                    Value:This result contains rich text formatting which cannot be displayed here.     Performing Labs 03/22/2023    Final                    Value:This result contains rich text formatting which cannot be displayed here.             Video-Visit Details    Type of service:  Video Visit   Start time: 4:00pm  Stop time: 4:18pm  Total time on video= 18 minutes     Originating Location (pt. Location): Home  Distant Location (provider location):  On-site  Platform used for Video Visit: [employer]

## 2024-01-08 ENCOUNTER — PATIENT OUTREACH (OUTPATIENT)
Dept: CARE COORDINATION | Facility: CLINIC | Age: 39
End: 2024-01-08
Payer: COMMERCIAL

## 2024-01-22 ENCOUNTER — PATIENT OUTREACH (OUTPATIENT)
Dept: CARE COORDINATION | Facility: CLINIC | Age: 39
End: 2024-01-22
Payer: COMMERCIAL

## 2024-02-29 ENCOUNTER — VIRTUAL VISIT (OUTPATIENT)
Dept: FAMILY MEDICINE | Facility: CLINIC | Age: 39
End: 2024-02-29
Payer: COMMERCIAL

## 2024-02-29 DIAGNOSIS — E04.2 MULTINODULAR THYROID: ICD-10-CM

## 2024-02-29 DIAGNOSIS — Z12.31 VISIT FOR SCREENING MAMMOGRAM: ICD-10-CM

## 2024-02-29 DIAGNOSIS — F41.9 ANXIETY: ICD-10-CM

## 2024-02-29 DIAGNOSIS — F43.23 ADJUSTMENT DISORDER WITH MIXED ANXIETY AND DEPRESSED MOOD: Primary | ICD-10-CM

## 2024-02-29 PROCEDURE — 99213 OFFICE O/P EST LOW 20 MIN: CPT | Mod: 95 | Performed by: FAMILY MEDICINE

## 2024-02-29 RX ORDER — BUPROPION HYDROCHLORIDE 150 MG/1
150 TABLET ORAL EVERY MORNING
Qty: 90 TABLET | Refills: 1 | Status: SHIPPED | OUTPATIENT
Start: 2024-02-29 | End: 2024-05-09

## 2024-02-29 RX ORDER — ESCITALOPRAM OXALATE 5 MG/1
5 TABLET ORAL DAILY
Qty: 90 TABLET | Refills: 1 | Status: SHIPPED | OUTPATIENT
Start: 2024-02-29 | End: 2024-05-09

## 2024-02-29 ASSESSMENT — ANXIETY QUESTIONNAIRES
7. FEELING AFRAID AS IF SOMETHING AWFUL MIGHT HAPPEN: NOT AT ALL
3. WORRYING TOO MUCH ABOUT DIFFERENT THINGS: SEVERAL DAYS
GAD7 TOTAL SCORE: 2
6. BECOMING EASILY ANNOYED OR IRRITABLE: SEVERAL DAYS
5. BEING SO RESTLESS THAT IT IS HARD TO SIT STILL: NOT AT ALL
4. TROUBLE RELAXING: NOT AT ALL
IF YOU CHECKED OFF ANY PROBLEMS ON THIS QUESTIONNAIRE, HOW DIFFICULT HAVE THESE PROBLEMS MADE IT FOR YOU TO DO YOUR WORK, TAKE CARE OF THINGS AT HOME, OR GET ALONG WITH OTHER PEOPLE: NOT DIFFICULT AT ALL
2. NOT BEING ABLE TO STOP OR CONTROL WORRYING: NOT AT ALL
8. IF YOU CHECKED OFF ANY PROBLEMS, HOW DIFFICULT HAVE THESE MADE IT FOR YOU TO DO YOUR WORK, TAKE CARE OF THINGS AT HOME, OR GET ALONG WITH OTHER PEOPLE?: NOT DIFFICULT AT ALL
7. FEELING AFRAID AS IF SOMETHING AWFUL MIGHT HAPPEN: NOT AT ALL
GAD7 TOTAL SCORE: 2
GAD7 TOTAL SCORE: 2
1. FEELING NERVOUS, ANXIOUS, OR ON EDGE: NOT AT ALL

## 2024-02-29 ASSESSMENT — PATIENT HEALTH QUESTIONNAIRE - PHQ9
SUM OF ALL RESPONSES TO PHQ QUESTIONS 1-9: 4
10. IF YOU CHECKED OFF ANY PROBLEMS, HOW DIFFICULT HAVE THESE PROBLEMS MADE IT FOR YOU TO DO YOUR WORK, TAKE CARE OF THINGS AT HOME, OR GET ALONG WITH OTHER PEOPLE: NOT DIFFICULT AT ALL
SUM OF ALL RESPONSES TO PHQ QUESTIONS 1-9: 4

## 2024-02-29 NOTE — PROGRESS NOTES
" [facility]  [address] [place] [address]  Office: [phone]   Fax:    [phone]    Melisa is a 39 year old who is being evaluated via a billable video visit.      How would you like to obtain your AVS? MyChart  If the video visit is dropped, the invitation should be resent by: Text to cell phone: [phone]  Will anyone else be joining your video visit? No          Assessment & Plan :       ICD-10-CM    1. Adjustment disorder with mixed anxiety and depressed mood- daughter Mini's cancer scare, work stress, her previous cancer-scare (5 thyroid nodules) - all her bx's negative   F43.23 escitalopram (LEXAPRO) 5 MG tablet     buPROPion (WELLBUTRIN XL) 150 MG 24 hr tablet      2. Anxiety  F41.9 escitalopram (LEXAPRO) 5 MG tablet     buPROPion (WELLBUTRIN XL) 150 MG 24 hr tablet      3. Visit for screening mammogram  Z12.31 REVIEW OF HEALTH MAINTENANCE PROTOCOL ORDERS     MA Screening Bilateral w/ Contreras      4. Multinodular thyroid- biopsies benign - needs annual thyroid US for follow up - next one 3/2024 per endocrinology   E04.2 US Thyroid        Please, call our clinic or go to the ER immediately if signs or symptoms worsen or fail to improve as anticipated.         Ordering of each unique test  Prescription drug management  25 minutes spent by me on the date of the encounter doing chart review, history and exam, documentation and further activities per the note      BMI  Estimated body mass index is 33.81 kg/m  as calculated from the following:    Height as of 3/21/23: 1.626 m (5' 4\").    Weight as of 3/21/23: 89.4 kg (197 lb).   Weight management plan: Discussed healthy diet and exercise guidelines      MEDICATIONS:   Orders Placed This Encounter   Medications    escitalopram (LEXAPRO) 5 MG tablet     Sig: Take 1 tablet (5 mg) by mouth daily     Dispense:  90 tablet     Refill:  1     Please note change in dosage    buPROPion (WELLBUTRIN XL) 150 MG 24 hr tablet     Sig: " Take 1 tablet (150 mg) by mouth every morning     Dispense:  90 tablet     Refill:  1          - Continue other medications without change  Work on weight loss  Regular exercise  See Patient Instructions  Return in about 7 weeks (around 4/15/2024) for anxiety, as video visit, w/ Dr Geller.   Future Appointments 3/11/2024 - 9/7/2024        Date Visit Type Length Department Provider     3/14/2024  8:50 AM MA SCREENING BILATERAL W/ ULISES 15 min RH BREAST CENTER [id number]    Location Instructions:     [facility] [address], [address] [place] [address]   Parking  Breast Center customers can park in the lot adjacent to the entrance to the [facility].  Entrance and check-in location  Enter at the front entrance, under the canopy. Take the stairs or elevator from the main entrance to the 2nd floor. Please check-in for your appointment at the desk in the Breast Center waiting area.              3/14/2024  9:50 AM US THYROID 30 min RH ULTRASOUND RSCC [id number]    Location Instructions:     [facility] [address] [place] [address]  Parking Imaging customers can park either in the lot to the right side of the driveway (opposite the parking ramp) as you approach the [facility]ty [facility], or in the parking ramp.  Entrance and check-in location Enter at the front entrance (pictured to the right). As you enter the lobby, the Imaging Center is on the first floor, just past the elevators. Please check-in for your appointment at the desk in the Imaging Center waiting area, [address]              4/15/2024  1:00 PM OFFICE VISIT 20 min  FAMILY PRACTICE Aniyah Geller MD    Location Instructions:     [facility] is located at [address] [place] [address], along [address] Free parking is available; access the lot by turning north from [address]  onto [facility], then west onto [facility].                         Aniyah Geller MD      Subjective   Melisa is a 39 year old, presenting for the following health issues:   1. Adjustment disorder with mixed anxiety and depressed mood- daughter Mini's cancer scare, work stress, her previous cancer-scare (5 thyroid nodules) - all her bx's negative     2. Anxiety    3. Visit for screening mammogram    4. 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 Not document driven - not as much stress.      No libido at all -discussed options.  she'd like to decrease to lexapro to 5mg and add in wellbutrin xr 150mg to see if that helps.     Going to get thyroid US for her multinodular thyroid.     Sees Dr. Iraida Christian for her annual exams.     Daughter, Mini is doing well.          2/29/2024     4:33 PM   Last PHQ-9   1.  Little interest or pleasure in doing things 0   2.  Feeling down, depressed, or hopeless 0   3.  Trouble falling or staying asleep, or sleeping too much 2   4.  Feeling tired or having little energy 1   5.  Poor appetite or overeating 0   6.  Feeling bad about yourself 0   7.  Trouble concentrating 1   8.  Moving slowly or restless 0   Q9: Thoughts of better off dead/self-harm past 2 weeks 0   PHQ-9 Total Score 4         2/29/2024     4:34 PM   MICAELA-7    1. Feeling nervous, anxious, or on edge 0   2. Not being able to stop or control worrying 0   3. Worrying too much about different things 1   4. Trouble relaxing 0   5. Being so restless that it is hard to sit still 0   6. Becoming easily annoyed or irritable 1   7. Feeling afraid, as if something awful might happen 0   MICAELA-7 Total Score 2   If you checked any problems, how difficult  have they made it for you to do your work, take care of things at home, or get along with other people? Not difficult at all         5/25/2023     3:08 PM 8/25/2023    11:09 AM 2/29/2024     4:33 PM   PHQ   PHQ-9 Total Score 5 3 4   Q9: Thoughts of better off dead/self-harm past 2 weeks Not at all Not at all Not at all           5/25/2023     3:09 PM 8/25/2023    11:10 AM 2/29/2024     4:34 PM   MICAELA-7 SCORE   Total Score 5 (mild anxiety) 4 (minimal anxiety) 2 (minimal anxiety)   Total Score 5 4 2             Patient Active Problem List   Diagnosis    Anal fissure    Perianal irritation    Decreased rectal sphincter tone    Attention and concentration deficit    Enlargement of thyroid- with right sided nodularity - ? medial about 1cm cystic nodule palpated near the isthmus     Elevated blood pressure reading without diagnosis of hypertension    Adjustment disorder with mixed anxiety and depressed mood- daughter Mini's femur cancer scare (bx = stress frx),  work stress, her previous cancer-scare (5 thyroid nodules) - all her bx's negative     Anxiety    Fibrocystic breast changes of both breasts    Multinodular thyroid- biopsies benign - needs annual thyroid US for follow up - next one 3/2024 per endocrinology        Current Outpatient Medications   Medication Sig Dispense Refill    escitalopram (LEXAPRO) 10 MG tablet Take 1 tablet (10 mg) by mouth daily 90 tablet 1          Allergies   Allergen Reactions    Shellfish-Derived Products Anaphylaxis    Penicillins      Childhood reaction unk, possibly rash.              Review of Systems  Constitutional, HEENT, cardiovascular, pulmonary, GI, , musculoskeletal, neuro, skin, endocrine and psych systems are negative, except as otherwise noted.      Objective           Vitals:  No vitals were obtained today due to virtual visit.      GENERAL: alert and no distress  EYES: Eyes grossly normal to inspection.  No discharge or erythema, or obvious scleral/conjunctival  abnormalities.  RESP: No audible wheeze, cough, or visible cyanosis.    SKIN: Visible skin clear. No significant rash, abnormal pigmentation or lesions.  NEURO: Cranial nerves grossly intact.  Mentation and speech appropriate for age.  PSYCH: Appropriate affect, tone, and pace of words    Hospital Outpatient Visit on 03/22/2023   Component Date Value Ref Range Status    Final Diagnosis 03/22/2023    Final                    Value:This result contains rich text formatting which cannot be displayed here.    Comment 03/22/2023    Final                    Value:This result contains rich text formatting which cannot be displayed here.    Gross Description 03/22/2023    Final                    Value:This result contains rich text formatting which cannot be displayed here.    Microscopic Description 03/22/2023    Final                    Value:This result contains rich text formatting which cannot be displayed here.    Performing Labs 03/22/2023    Final                    Value:This result contains rich text formatting which cannot be displayed here.         Video-Visit Details    Type of service:  Video Visit     Originating Location (pt. Location): Home    Distant Location (provider location):  On-site  Platform used for Video Visit: [facility]  Signed Electronically by: Aniyah Geller MD  Answers submitted by the patient for this visit:  Patient Health Questionnaire (Submitted on 2/29/2024)  If you checked off any problems, how difficult have these problems made it for you to do your work, take care of things at home, or get along with other people?: Not difficult at all  PHQ9 TOTAL SCORE: 4  MICAELA-7 (Submitted on 2/29/2024)  MICAELA 7 TOTAL SCORE: 2  Depression / Anxiety Questionnaire (Submitted on 2/29/2024)  Chief Complaint: Chronic problems general questions HPI Form  Depression/Anxiety: Anxiety  Anxiety only (Submitted on 2/29/2024)  Chief Complaint: Chronic problems general questions HPI Form  Anxiety since last:  : better  Other associated symotome: : No  Significant life event: : No  Anxious:: Yes  Current substance use:: No  General Questionnaire (Submitted on 2/29/2024)  Chief Complaint: Chronic problems general questions HPI Form  How many servings of fruits and vegetables do you eat daily?: 2-3  On average, how many sweetened beverages do you drink each day (Examples: soda, juice, sweet tea, etc.  Do NOT count diet or artificially sweetened beverages)?: 0  How many minutes a day do you exercise enough to make your heart beat faster?: 30 to 60  How many days a week do you exercise enough to make your heart beat faster?: 4  How many days per week do you miss taking your medication?: 0

## 2024-03-01 NOTE — PATIENT INSTRUCTIONS
[facility]  [address] [place] [address]  Office: [phone]   Fax:    [phone]     Return in about 7 weeks (around 4/15/2024) for anxiety, as video visit, w/ Dr Geller.    Future Appointments 2/29/2024 - 8/27/2024        Date Visit Type Length Department Provider     4/15/2024  1:00 PM OFFICE VISIT 20 min [facility]ILY PRACTICE Aniyah Geller MD    Location Instructions:     [facility] is located at [address] [facility] [address], along [address] . Free parking is available; access the lot by turning north from [address] onto [facility], then west onto [facility].

## 2024-03-14 ENCOUNTER — HOSPITAL ENCOUNTER (OUTPATIENT)
Dept: MAMMOGRAPHY | Facility: CLINIC | Age: 39
Discharge: HOME | End: 2024-03-14
Attending: FAMILY MEDICINE
Payer: COMMERCIAL

## 2024-03-14 ENCOUNTER — HOSPITAL ENCOUNTER (OUTPATIENT)
Dept: ULTRASOUND IMAGING | Facility: CLINIC | Age: 39
Discharge: HOME | End: 2024-03-14
Attending: FAMILY MEDICINE
Payer: COMMERCIAL

## 2024-03-14 DIAGNOSIS — E04.2 MULTINODULAR THYROID: ICD-10-CM

## 2024-03-14 DIAGNOSIS — Z12.31 VISIT FOR SCREENING MAMMOGRAM: ICD-10-CM

## 2024-03-14 PROCEDURE — 77063 BREAST TOMOSYNTHESIS BI: CPT

## 2024-03-14 PROCEDURE — 76536 US EXAM OF HEAD AND NECK: CPT

## 2024-03-18 NOTE — RESULT ENCOUNTER NOTE
Dear Melisa,    Here is a summary of your recent test results:    -Mammogram was normal.  ADVISE: rechecking in 1 year.    For additional lab test information, labtestsonline.org is an excellent reference.    In addition, here is a list of due or overdue Health Maintenance reminders:    Discuss Advance Care Planning Never done  Flu Vaccine(1) due on 09/01/2023  COVID-19 Vaccine(5 - 2023-24 season) due on 09/01/2023  Yearly Preventive Visit due on 02/06/2024    Please call us at [phone] (or use [employer]) to address the above recommendations if needed.    Thank you for choosing [facility].  It was an honor and a privilege to participate in your care.       Healthy regards,    Elizabeth Barajas, ELIZABETH-BC (covering for Dr. Geller)   [facility]

## 2024-03-19 ENCOUNTER — MYC MEDICAL ADVICE (OUTPATIENT)
Dept: FAMILY MEDICINE | Facility: CLINIC | Age: 39
End: 2024-03-19
Payer: COMMERCIAL

## 2024-03-20 NOTE — RESULT ENCOUNTER NOTE
Melisa  I have reviewed your recent test results:    Your thyroid ultrasound shows a very mild enlargement of 2 of the 5 nodules in your thyroid when compared to the previous ultrasound from March 2023.  I do see that you have had multiple benign biopsies to date.  Dr. Geller is out of the office for a few months on a medical leave and thus, I have asked Dr. Bocanegra of general surgery that you saw last year if she would recommend that you follow-up with her to determine next steps/surveillance recommendations.  Once I hear back from her I will let you know what she advises.     If you have any questions please do not hesitate to contact our office via phone ([phone]) or [facility].    Healthy regards,     Tracey Gaston MBA, MS, PA-C  [facility]  (Covering for Dr. Geller)

## 2024-03-20 NOTE — TELEPHONE ENCOUNTER
Please see my chart message below     Please review and advise     Thank you     Melissa Mcfarland RN, BSN  [facility]

## 2024-03-22 PROBLEM — E04.2 MULTINODULAR THYROID: Status: ACTIVE | Noted: 2023-05-25

## 2024-04-07 ENCOUNTER — HEALTH MAINTENANCE LETTER (OUTPATIENT)
Age: 39
End: 2024-04-07

## 2024-04-17 ENCOUNTER — OFFICE VISIT (OUTPATIENT)
Dept: FAMILY MEDICINE | Facility: CLINIC | Age: 39
End: 2024-04-17
Payer: COMMERCIAL

## 2024-04-17 ENCOUNTER — HOSPITAL ENCOUNTER (OUTPATIENT)
Dept: ULTRASOUND IMAGING | Facility: CLINIC | Age: 39
Discharge: HOME | End: 2024-04-17
Attending: NURSE PRACTITIONER | Admitting: NURSE PRACTITIONER
Payer: COMMERCIAL

## 2024-04-17 VITALS
DIASTOLIC BLOOD PRESSURE: 78 MMHG | HEART RATE: 60 BPM | RESPIRATION RATE: 14 BRPM | SYSTOLIC BLOOD PRESSURE: 136 MMHG | BODY MASS INDEX: 35.85 KG/M2 | OXYGEN SATURATION: 98 % | TEMPERATURE: 99.4 F | HEIGHT: 64 IN | WEIGHT: 210 LBS

## 2024-04-17 DIAGNOSIS — M79.662 PAIN OF LEFT CALF: ICD-10-CM

## 2024-04-17 DIAGNOSIS — I82.452 ACUTE DEEP VEIN THROMBOSIS (DVT) OF LEFT PERONEAL VEIN (H): ICD-10-CM

## 2024-04-17 DIAGNOSIS — M79.662 PAIN OF LEFT CALF: Primary | ICD-10-CM

## 2024-04-17 PROCEDURE — 99214 OFFICE O/P EST MOD 30 MIN: CPT | Performed by: NURSE PRACTITIONER

## 2024-04-17 PROCEDURE — 93971 EXTREMITY STUDY: CPT | Mod: LT

## 2024-04-17 ASSESSMENT — PAIN SCALES - GENERAL: PAINLEVEL: SEVERE PAIN (7)

## 2024-04-17 ASSESSMENT — ENCOUNTER SYMPTOMS: LEG PAIN: 1

## 2024-04-17 NOTE — PROGRESS NOTES
Assessment & Plan     Pain of left calf  - US Lower Extremity Venous Duplex Left    Acute deep vein thrombosis (DVT) of left peroneal vein (H)  US findings: Examination of the deep veins with graded compression and color flow Doppler with spectral wave form analysis was performed.   There is nearly occlusive thrombus in both of the left peroneal veins extending from the mid to distal calf. Remaining deep veins of the left lower extremity are patent.  First incident of provoked DVT (air travel- 3 hour flight from [place]), will start rivaroxaban for a 3 month period. In light of age and shorter duration of flight will refer to hematology for additional work up if needed.   - Adult Oncology/Hematology  Referral  - Rivaroxaban ANTICOAGULANT 15 & 20 MG TBPK Starter Therapy Pack  Dispense: 51 each; Refill: 0  - rivaroxaban ANTICOAGULANT (XARELTO) 20 MG TABS tablet  Dispense: 30 tablet; Refill: 1    Return in about 2 weeks (around 5/1/2024) for No improvement or sooner with worsening symptoms.      Subjective   Melisa is a 39 year old, presenting for the following health issues:  Leg Pain        4/17/2024     8:51 AM   Additional Questions   Roomed by DANNIELLE HER   Accompanied by SELF     History of Present Illness       Reason for visit:  Sudden onset of calf pain unrelated to physical injury  Symptom onset:  1-3 days ago  Symptoms include:  Pain in left calf  Symptom intensity:  Severe  Symptom progression:  Worsening  Had these symptoms before:  No  What makes it worse:  Worse after sleeping or sitting for a longer time  What makes it better:  Not that I ve found    She eats 2-3 servings of fruits and vegetables daily.She consumes 1 sweetened beverage(s) daily.She exercises with enough effort to increase her heart rate 30 to 60 minutes per day.  She exercises with enough effort to increase her heart rate 6 days per week.   She is taking medications regularly.    Melisa is a 39 year old female with a PMH of  "anxiety and thryoid nodules presents with a 3 day history of calf pain. She woke Monday AM with pain in her left calf that is progressively getting worse. Pain is worse after sitting or sleeping. She reports she was in [place] over the weekend, flying home on Sunday (approx 3 hour flight). She had stayed up Saturday night and tried to sleep on the plane. In [place] she reports she didn't do anything unusual, a short hike, and walking around the strip. 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 She does not feel it's related to exercise as she had had injuries but this is not a sensation she has felt before. Last night the left calf felt warmer than the right. Overall she is well but was exhausted Monday after her flight and took a nap.     She does not take any estrogen containing products, has a mirena IUD  No hx of clots  No known injury to area      Review of Systems  CONSTITUTIONAL: NEGATIVE for fever, chills, change in weight  CV: NEGATIVE for chest pain, palpitations or peripheral edema  GI: NEGATIVE for nausea, abdominal pain, heartburn, or change in bowel habits  MUSCULOSKELETAL:arthralgias  HEME/ALLERGY/IMMUNE: easily bruises per patient      Objective    /78   Pulse 60   Temp 99.4  F (37.4  C) (Tympanic)   Resp 14   Ht 1.626 m (5' 4\")   Wt 95.3 kg (210 lb)   LMP 03/12/2024   SpO2 98%   BMI 36.05 kg/m    Body mass index is 36.05 kg/m .  /78   Pulse 60   Temp 99.4  F (37.4  C) (Tympanic)   Resp 14   Ht 1.626 m (5' 4\")   Wt 95.3 kg (210 lb)   LMP 03/12/2024   SpO2 98%   BMI 36.05 kg/m      Physical Exam  Cardiovascular:      Rate and Rhythm: Normal rate and regular rhythm.      Heart sounds: No murmur heard.     No friction rub. No gallop.   Pulmonary:      Effort: Pulmonary effort is normal. No respiratory distress.      Breath sounds: Normal breath sounds. No stridor. No wheezing, " rhonchi or rales.   Abdominal:      General: Abdomen is flat. Bowel sounds are normal.      Palpations: Abdomen is soft.   Musculoskeletal:      Right lower leg: No swelling or tenderness. No edema.      Left lower leg: Tenderness present. No swelling. No edema.        Legs:       Comments: Right calf measures 15.75 in  Left calf measures 15.25 in  Pain along left lateral calf, pos kd's sign   Skin:     General: Skin is warm and dry.      Findings: No bruising or erythema.   Neurological:      Mental Status: She is alert.              Melisa Garduno DNP Student at the [facility]      I was present with the student who participated in the service and in the documentation of the note, which I have reviewed and verified. The history, reviews of systems, objective data, and assessment/plan were completed by myself.      Signed Electronically by: TANVIR Gonzalez CNP

## 2024-05-03 NOTE — PROGRESS NOTES
[facility]  [address], [address], [place] [address]  Main: [phone], Fax: [phone]    Video Virtual Visit Note:    Patient: Melisa Burns  MRN: [id number]  : 1985  ROBIN: May 10, 2024  Location of the patient when this video visit is conducted: Patient's home  Location of this writer at the time of this video visit is conducted: [facility].     Due to the ongoing COVID-19 outbreak, this visit was conducted by video, with the patient's approval.    Reason of today's visit:  Left distal leg DVT on 2024.    HPI:  Melisa is a 39 year old female who is found to have a left distal leg DVT back on 2024, referred by Susan Osman CNP of [facility], for consultation.     Back around the 3/16/2024, she flew from [place] to [place] and ran a half marathon there on 3/17/2024, then she flew back to [place] on 3/19/2024. Flight to [place] from [place] is about 2 hours and 45 minutes each way. After the marathon, she did experience some left achilles area pain for a few days but by no means she was immobilized because of this.     Then she flew to [facility] on 3/28/2024 from [place] on vacation and spent one week there. She flew back to [place] on 2024. Flight to [facility] from [place] was about 3 hours and 40 minutes each way.     Then she flew out to [place] on 4/10/2024 from [place] and flew back to on 2024. Flight to [place] from [place] is about 3 hours and 25 minutes each way.     She has a Mirena IUD in place.     Then on 4/15/2024, she awoke with some left calf pain that was different than what she had experienced before, which progressively getting worse.     On 2024, she presented to [facility] for evaluation where a left leg venous ultrasound was performed, which  showed nearly occlusive thrombus in both of the left peroneal veins extending from the mid to distal calf. Remaining deep veins of the left leg are patent. She was then started on rivaroxaban and is referred to our clinic for consultation.     She reports no family history of DVT/PE.     Currently she is on rivaroxaban and just completed her 21 days of 15 mg PO BID dosing. Now she is on 20 mg PO Qday dosing. She reports that her left calf pain is about 95% resolved. She denies any left leg swelling. She denies any significant bleeding issues but does endorse noticing that her menstrual bleeding is heavier than that of prior to starting rivaroxaban.     ROS:  Denies any bleeding complications. Specifically, no frequent epistaxis. No issues with oral mucosal bleeding. Denies any hematuria or blood in stools. Denies any shortness of breath. No chest pain. No cough. No fever.      Medications:   Current Outpatient Medications   Medication Sig Dispense Refill    buPROPion (WELLBUTRIN XL) 150 MG 24 hr tablet Take 1 tablet (150 mg) by mouth every morning 90 tablet 1    escitalopram (LEXAPRO) 5 MG tablet Take 1 tablet (5 mg) by mouth daily 90 tablet 1    rivaroxaban ANTICOAGULANT (XARELTO) 20 MG TABS tablet Take 1 tablet (20 mg) by mouth daily (with dinner) 30 tablet 1    Rivaroxaban ANTICOAGULANT 15 & 20 MG TBPK Starter Therapy Pack Take 15 mg by mouth 2 times daily (with meals) for 21 days, THEN 20 mg daily with food for 9 days. 51 each 0     No current facility-administered medications for this visit.       Allergies:   Allergies   Allergen Reactions    Shellfish-Derived Products Anaphylaxis    Penicillins      Childhood reaction unk, possibly rash.        PMH:   Past Medical History:   Diagnosis Date    Anal fissure 2003       Social History:   She reports that she does travel quite a bit with work and personally. However, she stated that she hasn't been traveling that much within a short period of time in the past.      Family History:  She denies any family history of DVT/PE.     Objective:  Visual Examination via Video:  Pleasant in no acute distress.  Normal work of breathing   A+O x 3    Imaging:  Reviewed and is as described above.     Assessment:  In summary, Melisa is a 39 year old female with no significant past medical history who was found to have a distal left leg DVT back on 4/17/2024, referred to our clinic by Susan Osman CNP of [facility] for consultation.     Melisa had some <4 hours flights within a rather short period of time during the month of March 2024 and April 2024. Even thought each leg of these trips were not considered long distance flight travels, the fact that she has had multiple of these trips in a short period of time was likely the cause of her distal left leg DVT.     Diagnosis:  Provoked distal left leg DVT on 4/17/2024.     Plan:  I spent some time today to educate Melisa on DVT/PE, provoked vs unprovoked venous thromboembolic event and general approach in regard to anticoagulation therapy management and duration. I also answered all Melisa's questions to her satisfaction today. I explain to Melisa that her distal left leg DVT on 4/17/2024 was likely a provoked venous thromboembolic event and thus in accordance to current American Society of Hematology (VICKY) guidelines, 3-6 months of anticoagulation therapy should be suffice. I explain to her that in general, I will have her get a repeat left leg venous ultrasound about 3 months after she has been on anticoagulation therapy and if her thrombus has completely resolved, then I will likely stop her anticoagulation therapy. However if she has residual thrombus at 3 months, my general approach is that I will offer to have her stay on anticoagulation therapy for another 3 months and see if this will resolute the thrombus further.     Considering that this was a provoked venous thromboembolic event and that she has no family history of  DVT/PE, I do not feel that an extensive inherit or acquired thrombophilia workup is needed or indicated.     I also discuss with her about future prevention strategy. I explain to her that after her acute initial 3-6 months of anticoagulation therapy, I will have her go on episodic pharmacological DVT/PE prophylaxis at times of increase thromboembolic risk. Thus I discuss with her that she can take one tablet of rivaroxaban at 10 mg PO Q 24 hours as needed just prior to any flights that she takes in the future.     So my plan summary is as follow:  Continue rivaroxaban at 20 mg PO Qday.   Repeat left leg venous ultrasound on or after 7/17/2024.   I will contact her by phone once her repeat ultrasound report is back.   Will consider pharmacological DVT/PE prophylaxis at times of increase thromboembolic risk including any flights that she takes in the future with rivaroxaban at 10 mg PO Q 24 hours PRN.       Video-Visit Details:  Type of service:  Video Visit  Video Start Time:  11:02  Video End Time (time video stopped): 11:26  Originating Location (pt. Location): Home  Distant Location (provider location):  [facility]   Mode of Communication:  Video Conference via [employer]      Adams Conley PA-C, MPAS  Physician Assistant  [facility].     The longitudinal plan of care for these conditions below were addressed during this visit. Due to the added complexity in care, I will continue to support Melisa Burns  in the subsequent management of these conditions and with the ongoing continuity of care of these conditions.    45 minutes spent by me on the date of the encounter doing chart review, history and exam, documentation and further activities per the note

## 2024-05-08 ASSESSMENT — PATIENT HEALTH QUESTIONNAIRE - PHQ9
10. IF YOU CHECKED OFF ANY PROBLEMS, HOW DIFFICULT HAVE THESE PROBLEMS MADE IT FOR YOU TO DO YOUR WORK, TAKE CARE OF THINGS AT HOME, OR GET ALONG WITH OTHER PEOPLE: NOT DIFFICULT AT ALL
SUM OF ALL RESPONSES TO PHQ QUESTIONS 1-9: 3
SUM OF ALL RESPONSES TO PHQ QUESTIONS 1-9: 3

## 2024-05-09 ENCOUNTER — VIRTUAL VISIT (OUTPATIENT)
Dept: FAMILY MEDICINE | Facility: CLINIC | Age: 39
End: 2024-05-09
Payer: COMMERCIAL

## 2024-05-09 DIAGNOSIS — I82.452 ACUTE DEEP VEIN THROMBOSIS (DVT) OF LEFT PERONEAL VEIN (H): ICD-10-CM

## 2024-05-09 DIAGNOSIS — F43.23 ADJUSTMENT DISORDER WITH MIXED ANXIETY AND DEPRESSED MOOD: ICD-10-CM

## 2024-05-09 DIAGNOSIS — F41.9 ANXIETY: ICD-10-CM

## 2024-05-09 PROCEDURE — 99214 OFFICE O/P EST MOD 30 MIN: CPT | Mod: 95 | Performed by: FAMILY MEDICINE

## 2024-05-09 PROCEDURE — 96127 BRIEF EMOTIONAL/BEHAV ASSMT: CPT | Mod: 95 | Performed by: FAMILY MEDICINE

## 2024-05-09 PROCEDURE — G2211 COMPLEX E/M VISIT ADD ON: HCPCS | Mod: 95 | Performed by: FAMILY MEDICINE

## 2024-05-09 RX ORDER — ESCITALOPRAM OXALATE 5 MG/1
5 TABLET ORAL DAILY
Qty: 90 TABLET | Refills: 1 | Status: SHIPPED | OUTPATIENT
Start: 2024-05-09 | End: 2024-06-11

## 2024-05-09 RX ORDER — BUPROPION HYDROCHLORIDE 150 MG/1
150 TABLET ORAL EVERY MORNING
Qty: 90 TABLET | Refills: 1 | Status: SHIPPED | OUTPATIENT
Start: 2024-05-09 | End: 2024-06-11

## 2024-05-09 NOTE — PROGRESS NOTES
Melisa is a 39 year old who is being evaluated via a billable video visit.    How would you like to obtain your AVS? MyChart  If the video visit is dropped, the invitation should be resent by: Text to cell phone: [phone]  Will anyone else be joining your video visit? No      Assessment & Plan     Adjustment disorder with mixed anxiety and depressed mood- daughter Mini's cancer scare, work stress, her previous cancer-scare (5 thyroid nodules) - all her bx's negative   Stable, continue Wellbutrin, Lexparo  - buPROPion (WELLBUTRIN XL) 150 MG 24 hr tablet; Take 1 tablet (150 mg) by mouth every morning  - escitalopram (LEXAPRO) 5 MG tablet; Take 1 tablet (5 mg) by mouth daily    Anxiety  - buPROPion (WELLBUTRIN XL) 150 MG 24 hr tablet; Take 1 tablet (150 mg) by mouth every morning  - escitalopram (LEXAPRO) 5 MG tablet; Take 1 tablet (5 mg) by mouth daily    Acute deep vein thrombosis (DVT) of left peroneal vein (H)  - rivaroxaban ANTICOAGULANT (XARELTO) 20 MG TABS tablet; Take 1 tablet (20 mg) by mouth daily (with dinner)      Subjective   Melisa is a 39 year old, presenting for the following health issues:  Recheck Medication        5/9/2024    12:40 PM   Additional Questions   Roomed by Andi TELLEZ CMA     HPI     Depression and Anxiety   How are you doing with your depression since your last visit? No change  How are you doing with your anxiety since your last visit?  No change  Are you having other symptoms that might be associated with depression or anxiety? No  Have you had a significant life event? Job Concerns   Do you have any concerns with your use of alcohol or other drugs? No    Social History     Tobacco Use    Smoking status: Never    Smokeless tobacco: Never   Vaping Use    Vaping status: Never Used   Substance Use Topics    Alcohol use: Not Currently     Comment: 2 drinks on the weekends occasionally     Drug use: No     Comment: no herbal meds except for fish oil          2/29/2024     4:33 PM  5/6/2024     2:25 PM 5/8/2024     1:45 PM   PHQ   PHQ-9 Total Score 4 3 3   Q9: Thoughts of better off dead/self-harm past 2 weeks Not at all Not at all Not at all         8/25/2023    11:10 AM 2/29/2024     4:34 PM 5/6/2024     2:25 PM   MICAELA-7 SCORE   Total Score 4 (minimal anxiety) 2 (minimal anxiety) 2 (minimal anxiety)   Total Score 4 2 2         5/8/2024     1:45 PM   Last PHQ-9   1.  Little interest or pleasure in doing things 0   2.  Feeling down, depressed, or hopeless 1   3.  Trouble falling or staying asleep, or sleeping too much 1   4.  Feeling tired or having little energy 1   5.  Poor appetite or overeating 0   6.  Feeling bad about yourself 0   7.  Trouble concentrating 0   8.  Moving slowly or restless 0   Q9: Thoughts of better off dead/self-harm past 2 weeks 0   PHQ-9 Total Score 3         5/6/2024     2:25 PM   MICAELA-7    1. Feeling nervous, anxious, or on edge 1   2. Not being able to stop or control worrying 0   3. Worrying too much about different things 0   4. Trouble relaxing 0   5. Being so restless that it is hard to sit still 0   6. Becoming easily annoyed or irritable 1   7. Feeling afraid, as if something awful might happen 0   MICAELA-7 Total Score 2   If you checked any problems, how difficult have they made it for you to do your work, take care of things at home, or get along with other people? Not difficult at all           Review of Systems  Constitutional, HEENT, cardiovascular, pulmonary, gi and gu systems are negative, except as otherwise noted.      Objective       Vitals:  No vitals were obtained today due to virtual visit.    Physical Exam   GENERAL: alert and no distress  EYES: Eyes grossly normal to inspection.  No discharge or erythema, or obvious scleral/conjunctival abnormalities.  RESP: No audible wheeze, cough, or visible cyanosis.    SKIN: Visible skin clear. No significant rash, abnormal pigmentation or lesions.  NEURO: Cranial nerves grossly intact.  Mentation and speech  appropriate for age.  PSYCH: Appropriate affect, tone, and pace of words        Video-Visit Details    Type of service:  Video Visit   Originating Location (pt. Location): Home    Distant Location (provider location):  On-site  Platform used for Video Visit: Andrew    The longitudinal plan of care for the diagnosis(es)/condition(s) as documented were addressed during this visit. Due to the added complexity in care, I will continue to support Melisa in the subsequent management and with ongoing continuity of care.    Signed Electronically by: Cruzito Barreto DO

## 2024-05-10 ENCOUNTER — VIRTUAL VISIT (OUTPATIENT)
Dept: HEMATOLOGY | Facility: CLINIC | Age: 39
End: 2024-05-10
Attending: NURSE PRACTITIONER
Payer: COMMERCIAL

## 2024-05-10 VITALS — WEIGHT: 210 LBS | BODY MASS INDEX: 36.05 KG/M2

## 2024-05-10 DIAGNOSIS — Z86.718 HISTORY OF DEEP VENOUS THROMBOSIS: Primary | ICD-10-CM

## 2024-05-10 DIAGNOSIS — I82.452 ACUTE DEEP VEIN THROMBOSIS (DVT) OF LEFT PERONEAL VEIN (H): ICD-10-CM

## 2024-05-10 PROCEDURE — 99204 OFFICE O/P NEW MOD 45 MIN: CPT | Mod: 95 | Performed by: PHYSICIAN ASSISTANT

## 2024-05-10 PROCEDURE — G2211 COMPLEX E/M VISIT ADD ON: HCPCS | Mod: 95 | Performed by: PHYSICIAN ASSISTANT

## 2024-05-10 NOTE — LETTER
[facility]  [facility]  [address], [address], [place] [address]  Main: [phone], Fax: [phone]    Video Virtual Visit Note:    Patient: Melisa Burns  MRN: [id number]  : 1985  ROBIN: May 10, 2024  Location of the patient when this video visit is conducted: Patient's home  Location of this writer at the time of this video visit is conducted: [facility].     Due to the ongoing COVID-19 outbreak, this visit was conducted by video, with the patient's approval.    Reason of today's visit:  Left distal leg DVT on 2024.    HPI:  Melisa is a 39 year old female who is found to have a left distal leg DVT back on 2024, referred by Susan Osman CNP of [facility], for consultation.     Back around the 3/16/2024, she flew from [place] to [place] and ran a half marathon there on 3/17/2024, then she flew back to [place] on 3/19/2024. Flight to [place] from [place] is about 2 hours and 45 minutes each way. After the marathon, she did experience some left achilles area pain for a few days but by no means she was immobilized because of this.     Then she flew to [facility] on 3/28/2024 from [place] on vacation and spent one week there. She flew back to [place] on 2024. Flight to [facility] from [place] was about 3 hours and 40 minutes each way.     Then she flew out to [place] on 4/10/2024 from [place] and flew back to on 2024. Flight to [place] from [place] is about 3 hours and 25 minutes each way.     She has a Mirena IUD in place.     Then on 4/15/2024, she awoke with some left calf pain that was different than what she had experienced before, which progressively getting worse.     On 2024, she presented to [facility] for evaluation where a left leg venous ultrasound was performed,  which showed nearly occlusive thrombus in both of the left peroneal veins extending from the mid to distal calf. Remaining deep veins of the left leg are patent. She was then started on rivaroxaban and is referred to our clinic for consultation.     She reports no family history of DVT/PE.     Currently she is on rivaroxaban and just completed her 21 days of 15 mg PO BID dosing. Now she is on 20 mg PO Qday dosing. She reports that her left calf pain is about 95% resolved. She denies any left leg swelling. She denies any significant bleeding issues but does endorse noticing that her menstrual bleeding is heavier than that of prior to starting rivaroxaban.     ROS:  Denies any bleeding complications. Specifically, no frequent epistaxis. No issues with oral mucosal bleeding. Denies any hematuria or blood in stools. Denies any shortness of breath. No chest pain. No cough. No fever.      Medications:   Current Outpatient Medications   Medication Sig Dispense Refill     buPROPion (WELLBUTRIN XL) 150 MG 24 hr tablet Take 1 tablet (150 mg) by mouth every morning 90 tablet 1     escitalopram (LEXAPRO) 5 MG tablet Take 1 tablet (5 mg) by mouth daily 90 tablet 1     rivaroxaban ANTICOAGULANT (XARELTO) 20 MG TABS tablet Take 1 tablet (20 mg) by mouth daily (with dinner) 30 tablet 1     Rivaroxaban ANTICOAGULANT 15 & 20 MG TBPK Starter Therapy Pack Take 15 mg by mouth 2 times daily (with meals) for 21 days, THEN 20 mg daily with food for 9 days. 51 each 0     No current facility-administered medications for this visit.       Allergies:   Allergies   Allergen Reactions     Shellfish-Derived Products Anaphylaxis     Penicillins      Childhood reaction unk, possibly rash.        PMH:   Past Medical History:   Diagnosis Date     Anal fissure 2003       Social History:   She reports that she does travel quite a bit with work and personally. However, she stated that she hasn't been traveling that much within a short period of time in  the past.     Family History:  She denies any family history of DVT/PE.     Objective:  Visual Examination via Video:  Pleasant in no acute distress.  Normal work of breathing   A+O x 3    Imaging:  Reviewed and is as described above.     Assessment:  In summary, Melisa is a 39 year old female with no significant past medical history who was found to have a distal left leg DVT back on 4/17/2024, referred to our clinic by Susan Osman CNP of [facility] for consultation.     Melisa had some <4 hours flights within a rather short period of time during the month of March 2024 and April 2024. Even thought each leg of these trips were not considered long distance flight travels, the fact that she has had multiple of these trips in a short period of time was likely the cause of her distal left leg DVT.     Diagnosis:  Provoked distal left leg DVT on 4/17/2024.     Plan:  I spent some time today to educate Melisa on DVT/PE, provoked vs unprovoked venous thromboembolic event and general approach in regard to anticoagulation therapy management and duration. I also answered all Melisa's questions to her satisfaction today. I explain to Melisa that her distal left leg DVT on 4/17/2024 was likely a provoked venous thromboembolic event and thus in accordance to current American Society of Hematology (VICKY) guidelines, 3-6 months of anticoagulation therapy should be suffice. I explain to her that in general, I will have her get a repeat left leg venous ultrasound about 3 months after she has been on anticoagulation therapy and if her thrombus has completely resolved, then I will likely stop her anticoagulation therapy. However if she has residual thrombus at 3 months, my general approach is that I will offer to have her stay on anticoagulation therapy for another 3 months and see if this will resolute the thrombus further.     Considering that this was a provoked venous thromboembolic event and that she has no family  history of DVT/PE, I do not feel that an extensive inherit or acquired thrombophilia workup is needed or indicated.     I also discuss with her about future prevention strategy. I explain to her that after her acute initial 3-6 months of anticoagulation therapy, I will have her go on episodic pharmacological DVT/PE prophylaxis at times of increase thromboembolic risk. Thus I discuss with her that she can take one tablet of rivaroxaban at 10 mg PO Q 24 hours as needed just prior to any flights that she takes in the future.     So my plan summary is as follow:  Continue rivaroxaban at 20 mg PO Qday.   Repeat left leg venous ultrasound on or after 7/17/2024.   I will contact her by phone once her repeat ultrasound report is back.   Will consider pharmacological DVT/PE prophylaxis at times of increase thromboembolic risk including any flights that she takes in the future with rivaroxaban at 10 mg PO Q 24 hours PRN.       Video-Visit Details:  Type of service:  Video Visit  Video Start Time:  11:02  Video End Time (time video stopped): 11:26  Originating Location (pt. Location): Home  Distant Location (provider location):  [facility]   Mode of Communication:  Video Conference via Mfuse      Adams Conley PA-C MPAS  Physician Assistant  [facility].     The longitudinal plan of care for these conditions below were addressed during this visit. Due to the added complexity in care, I will continue to support Melisa Burns  in the subsequent management of these conditions and with the ongoing continuity of care of these conditions.    45 minutes spent by me on the date of the encounter doing chart review, history and exam, documentation and further activities per the note      Vitals completed along with Medication and Allergy review.    Christina Woodward

## 2024-05-10 NOTE — PATIENT INSTRUCTIONS
Melisa,    It was nice to meet you via video visit today.    Below is a summary of our plan:  Please continue to take your rivaroxaban (Xarelto) at 20 mg by mouth every 24 hours with food.   One of our nursing staffs will contact you in the next few business days to help with coordinating you getting a repeat left leg venous ultrasound around 7/17/2024.   Depending of the report on that ultrasound, we might be able to stop your blood thinning medication if your clot has resolved. And if you still have a residual clot, then we can talk about having you stay on the blood thinning medication for another 3 months.   We will plan to have you do what we called episodic prevention in the future with having you take Xarelto at 10 mg by mouth just prior to any flight travels in the future.   If you should have any further questions, please call us at [phone] and ask to speak to a nursing staff.    Thank you once again in choosing our clinic as part of your healthcare team.      Adams Conley PA-C, MPAS  Physician Assistant  [facility].

## 2024-06-10 ENCOUNTER — MYC REFILL (OUTPATIENT)
Dept: FAMILY MEDICINE | Facility: CLINIC | Age: 39
End: 2024-06-10
Payer: COMMERCIAL

## 2024-06-10 DIAGNOSIS — F41.9 ANXIETY: ICD-10-CM

## 2024-06-10 DIAGNOSIS — I82.452 ACUTE DEEP VEIN THROMBOSIS (DVT) OF LEFT PERONEAL VEIN (H): ICD-10-CM

## 2024-06-10 DIAGNOSIS — F43.23 ADJUSTMENT DISORDER WITH MIXED ANXIETY AND DEPRESSED MOOD: ICD-10-CM

## 2024-06-10 RX ORDER — BUPROPION HYDROCHLORIDE 150 MG/1
150 TABLET ORAL EVERY MORNING
Qty: 90 TABLET | Refills: 1 | Status: CANCELLED | OUTPATIENT
Start: 2024-06-10

## 2024-06-10 RX ORDER — ESCITALOPRAM OXALATE 5 MG/1
5 TABLET ORAL DAILY
Qty: 90 TABLET | Refills: 1 | Status: CANCELLED | OUTPATIENT
Start: 2024-06-10

## 2024-06-11 RX ORDER — BUPROPION HYDROCHLORIDE 150 MG/1
150 TABLET ORAL EVERY MORNING
Qty: 90 TABLET | Refills: 0 | Status: SHIPPED | OUTPATIENT
Start: 2024-06-11

## 2024-06-11 RX ORDER — ESCITALOPRAM OXALATE 5 MG/1
5 TABLET ORAL DAILY
Qty: 90 TABLET | Refills: 0 | Status: SHIPPED | OUTPATIENT
Start: 2024-06-11

## 2024-06-16 DIAGNOSIS — I82.452 ACUTE DEEP VEIN THROMBOSIS (DVT) OF LEFT PERONEAL VEIN (H): ICD-10-CM

## 2024-06-17 RX ORDER — RIVAROXABAN 20 MG/1
20 TABLET, FILM COATED ORAL
Qty: 30 TABLET | Refills: 0 | OUTPATIENT
Start: 2024-06-17

## 2024-07-18 ENCOUNTER — TELEPHONE (OUTPATIENT)
Dept: HEMATOLOGY | Facility: CLINIC | Age: 39
End: 2024-07-18
Payer: COMMERCIAL

## 2024-07-18 ENCOUNTER — HOSPITAL ENCOUNTER (OUTPATIENT)
Dept: ULTRASOUND IMAGING | Facility: CLINIC | Age: 39
Discharge: HOME | End: 2024-07-18
Attending: PHYSICIAN ASSISTANT | Admitting: PHYSICIAN ASSISTANT
Payer: COMMERCIAL

## 2024-07-18 DIAGNOSIS — Z86.718 HISTORY OF DEEP VENOUS THROMBOSIS: Primary | ICD-10-CM

## 2024-07-18 DIAGNOSIS — Z86.718 HISTORY OF DEEP VENOUS THROMBOSIS: ICD-10-CM

## 2024-07-18 PROCEDURE — 93971 EXTREMITY STUDY: CPT | Mod: LT

## 2024-07-18 NOTE — TELEPHONE ENCOUNTER
[facility]  [facility]  [facility], [address], [place] [address]  Main: [phone], Fax: [phone]    Telephone Note:    Patient: Melisa Burns  MRN: [id number]  : 1985  Date of this note written: 2024    This writer called Melisa on 2024 at 15:40 and communicated the ultrasound report to her. I explain to her that her DVT has resolved. She has now completed a total of 3 months of anticoagulation therapy and thus I have instructed her to discontinue her rivaroxaban as of today.     She is instructed to take a rivaroxaban tablet at 10 mg PO Q 24 hours as needed for long distance car or flight travels of >4 hours. I have provided her with a rivaroxaban 10 mg tablets today dispensing 10 tablets with one refill. I explain to her that she can have her primary care provider continue to prescribe this for her in the future or if she would like this writer to prescribe this for her in the future, I will likely need to see her back annually to do a refill. (Virtual visit is fine).     Left leg venous ultrasound done today (2024):  No evidence of deep venous thrombosis in the left lower extremity. The previously noted thrombus has a resolved.      Adams Conley PA-C, MPAS  Physician Assistant  [facility].

## 2024-07-31 ENCOUNTER — DOCUMENTATION ONLY (OUTPATIENT)
Dept: ANTICOAGULATION | Facility: CLINIC | Age: 39
End: 2024-07-31
Payer: COMMERCIAL

## 2024-07-31 NOTE — PROGRESS NOTES
Anticoagulant Therapeutic Duplication    Duplicate orders identified: same medication but different dose, form, frequency or route    The duplicate anticoagulant order(s) has been discontinued    Active anticoagulant: rivaroxaban (Xarelto)    Plan made per [facility] anticoagulation protocol.    Augusto Bourne RN  7/31/2024

## 2024-08-04 ENCOUNTER — PATIENT OUTREACH (OUTPATIENT)
Dept: CARE COORDINATION | Facility: CLINIC | Age: 39
End: 2024-08-04
Payer: COMMERCIAL

## 2024-11-09 DIAGNOSIS — F41.9 ANXIETY: ICD-10-CM

## 2024-11-09 DIAGNOSIS — F43.23 ADJUSTMENT DISORDER WITH MIXED ANXIETY AND DEPRESSED MOOD: ICD-10-CM

## 2024-11-13 RX ORDER — ESCITALOPRAM OXALATE 5 MG/1
5 TABLET ORAL DAILY
Qty: 90 TABLET | Refills: 0 | Status: SHIPPED | OUTPATIENT
Start: 2024-11-13

## 2024-11-13 RX ORDER — BUPROPION HYDROCHLORIDE 150 MG/1
150 TABLET ORAL EVERY MORNING
Qty: 90 TABLET | Refills: 0 | Status: SHIPPED | OUTPATIENT
Start: 2024-11-13

## 2025-01-24 DIAGNOSIS — F43.23 ADJUSTMENT DISORDER WITH MIXED ANXIETY AND DEPRESSED MOOD: ICD-10-CM

## 2025-01-24 DIAGNOSIS — F41.9 ANXIETY: ICD-10-CM

## 2025-01-28 RX ORDER — BUPROPION HYDROCHLORIDE 150 MG/1
150 TABLET ORAL EVERY MORNING
Qty: 90 TABLET | Refills: 0 | Status: SHIPPED | OUTPATIENT
Start: 2025-01-28

## 2025-01-28 RX ORDER — ESCITALOPRAM OXALATE 5 MG/1
5 TABLET ORAL DAILY
Qty: 90 TABLET | Refills: 0 | Status: SHIPPED | OUTPATIENT
Start: 2025-01-28

## 2025-01-28 NOTE — TELEPHONE ENCOUNTER
PHQ-9 score:        5/8/2024     1:45 PM   PHQ   PHQ-9 Total Score 3   Q9: Thoughts of better off dead/self-harm past 2 weeks Not at all     Pt needs up to date phq9 and GAD7 and appt to see me in person for a physical .  I haven't seen her in person since 3/8/2023.  Please make a 40 minute (long) office visit to see me in the next month or two.  Ok to take have our schedulers take any 2-20 minute slots together.      This is last rx without pt being seen in person, please.  Please inform patient.

## 2025-01-28 NOTE — TELEPHONE ENCOUNTER
See other refill for buproprion as well  pt needs to be seen.  This is last refill without pt being seen in person.

## 2025-03-05 SDOH — HEALTH STABILITY: PHYSICAL HEALTH: ON AVERAGE, HOW MANY MINUTES DO YOU ENGAGE IN EXERCISE AT THIS LEVEL?: 50 MIN

## 2025-03-05 SDOH — HEALTH STABILITY: PHYSICAL HEALTH: ON AVERAGE, HOW MANY DAYS PER WEEK DO YOU ENGAGE IN MODERATE TO STRENUOUS EXERCISE (LIKE A BRISK WALK)?: 4 DAYS

## 2025-03-05 ASSESSMENT — PATIENT HEALTH QUESTIONNAIRE - PHQ9
10. IF YOU CHECKED OFF ANY PROBLEMS, HOW DIFFICULT HAVE THESE PROBLEMS MADE IT FOR YOU TO DO YOUR WORK, TAKE CARE OF THINGS AT HOME, OR GET ALONG WITH OTHER PEOPLE: NOT DIFFICULT AT ALL
SUM OF ALL RESPONSES TO PHQ QUESTIONS 1-9: 2
SUM OF ALL RESPONSES TO PHQ QUESTIONS 1-9: 2

## 2025-03-05 ASSESSMENT — ANXIETY QUESTIONNAIRES
4. TROUBLE RELAXING: NOT AT ALL
1. FEELING NERVOUS, ANXIOUS, OR ON EDGE: MORE THAN HALF THE DAYS
7. FEELING AFRAID AS IF SOMETHING AWFUL MIGHT HAPPEN: SEVERAL DAYS
5. BEING SO RESTLESS THAT IT IS HARD TO SIT STILL: NOT AT ALL
6. BECOMING EASILY ANNOYED OR IRRITABLE: NOT AT ALL
8. IF YOU CHECKED OFF ANY PROBLEMS, HOW DIFFICULT HAVE THESE MADE IT FOR YOU TO DO YOUR WORK, TAKE CARE OF THINGS AT HOME, OR GET ALONG WITH OTHER PEOPLE?: SOMEWHAT DIFFICULT
2. NOT BEING ABLE TO STOP OR CONTROL WORRYING: SEVERAL DAYS
GAD7 TOTAL SCORE: 4
GAD7 TOTAL SCORE: 4
7. FEELING AFRAID AS IF SOMETHING AWFUL MIGHT HAPPEN: SEVERAL DAYS
IF YOU CHECKED OFF ANY PROBLEMS ON THIS QUESTIONNAIRE, HOW DIFFICULT HAVE THESE PROBLEMS MADE IT FOR YOU TO DO YOUR WORK, TAKE CARE OF THINGS AT HOME, OR GET ALONG WITH OTHER PEOPLE: SOMEWHAT DIFFICULT
3. WORRYING TOO MUCH ABOUT DIFFERENT THINGS: NOT AT ALL
GAD7 TOTAL SCORE: 4

## 2025-03-05 ASSESSMENT — SOCIAL DETERMINANTS OF HEALTH (SDOH): HOW OFTEN DO YOU GET TOGETHER WITH FRIENDS OR RELATIVES?: ONCE A WEEK

## 2025-03-06 ENCOUNTER — OFFICE VISIT (OUTPATIENT)
Dept: FAMILY MEDICINE | Facility: CLINIC | Age: 40
End: 2025-03-06
Payer: COMMERCIAL

## 2025-03-06 VITALS
OXYGEN SATURATION: 98 % | WEIGHT: 197 LBS | HEIGHT: 64 IN | RESPIRATION RATE: 17 BRPM | TEMPERATURE: 97.9 F | SYSTOLIC BLOOD PRESSURE: 126 MMHG | DIASTOLIC BLOOD PRESSURE: 84 MMHG | HEART RATE: 61 BPM | BODY MASS INDEX: 33.63 KG/M2

## 2025-03-06 DIAGNOSIS — R00.1 BRADYCARDIA: ICD-10-CM

## 2025-03-06 DIAGNOSIS — Z01.419 ENCOUNTER FOR GYNECOLOGICAL EXAMINATION WITHOUT ABNORMAL FINDING: ICD-10-CM

## 2025-03-06 DIAGNOSIS — Z12.31 VISIT FOR SCREENING MAMMOGRAM: ICD-10-CM

## 2025-03-06 DIAGNOSIS — F41.9 ANXIETY: ICD-10-CM

## 2025-03-06 DIAGNOSIS — F43.23 ADJUSTMENT DISORDER WITH MIXED ANXIETY AND DEPRESSED MOOD: ICD-10-CM

## 2025-03-06 DIAGNOSIS — I82.452 ACUTE DEEP VEIN THROMBOSIS (DVT) OF LEFT PERONEAL VEIN (H): ICD-10-CM

## 2025-03-06 DIAGNOSIS — Z86.718 HISTORY OF DEEP VENOUS THROMBOSIS: ICD-10-CM

## 2025-03-06 DIAGNOSIS — R03.0 ELEVATED BLOOD PRESSURE READING WITHOUT DIAGNOSIS OF HYPERTENSION: ICD-10-CM

## 2025-03-06 DIAGNOSIS — Z00.00 ROUTINE GENERAL MEDICAL EXAMINATION AT A HEALTH CARE FACILITY: Primary | ICD-10-CM

## 2025-03-06 DIAGNOSIS — Z30.430 ENCOUNTER FOR INSERTION OF MIRENA IUD: ICD-10-CM

## 2025-03-06 DIAGNOSIS — Z13.6 CARDIOVASCULAR SCREENING; LDL GOAL LESS THAN 130: ICD-10-CM

## 2025-03-06 DIAGNOSIS — E04.2 MULTINODULAR THYROID: ICD-10-CM

## 2025-03-06 LAB
ALBUMIN SERPL BCG-MCNC: 4.2 G/DL (ref 3.5–5.2)
ALP SERPL-CCNC: 42 U/L (ref 40–150)
ALT SERPL W P-5'-P-CCNC: 21 U/L (ref 0–50)
ANION GAP SERPL CALCULATED.3IONS-SCNC: 9 MMOL/L (ref 7–15)
AST SERPL W P-5'-P-CCNC: 30 U/L (ref 0–45)
BILIRUB SERPL-MCNC: 0.9 MG/DL
BUN SERPL-MCNC: 11.5 MG/DL (ref 6–20)
CALCIUM SERPL-MCNC: 9.2 MG/DL (ref 8.8–10.4)
CHLORIDE SERPL-SCNC: 104 MMOL/L (ref 98–107)
CHOLEST SERPL-MCNC: 235 MG/DL
CREAT SERPL-MCNC: 0.88 MG/DL (ref 0.51–0.95)
EGFRCR SERPLBLD CKD-EPI 2021: 84 ML/MIN/1.73M2
ERYTHROCYTE [DISTWIDTH] IN BLOOD BY AUTOMATED COUNT: 13.2 % (ref 10–15)
FASTING STATUS PATIENT QL REPORTED: ABNORMAL
FASTING STATUS PATIENT QL REPORTED: NORMAL
GLUCOSE SERPL-MCNC: 94 MG/DL (ref 70–99)
HCO3 SERPL-SCNC: 26 MMOL/L (ref 22–29)
HCT VFR BLD AUTO: 40.2 % (ref 35–47)
HDLC SERPL-MCNC: 68 MG/DL
HGB BLD-MCNC: 13.7 G/DL (ref 11.7–15.7)
LDLC SERPL CALC-MCNC: 143 MG/DL
MCH RBC QN AUTO: 31.2 PG (ref 26.5–33)
MCHC RBC AUTO-ENTMCNC: 34.1 G/DL (ref 31.5–36.5)
MCV RBC AUTO: 92 FL (ref 78–100)
NONHDLC SERPL-MCNC: 167 MG/DL
PLATELET # BLD AUTO: 196 10E3/UL (ref 150–450)
POTASSIUM SERPL-SCNC: 4.4 MMOL/L (ref 3.4–5.3)
PROT SERPL-MCNC: 7.2 G/DL (ref 6.4–8.3)
RBC # BLD AUTO: 4.39 10E6/UL (ref 3.8–5.2)
SODIUM SERPL-SCNC: 139 MMOL/L (ref 135–145)
TRIGL SERPL-MCNC: 118 MG/DL
TSH SERPL DL<=0.005 MIU/L-ACNC: 1.02 UIU/ML (ref 0.3–4.2)
WBC # BLD AUTO: 5.9 10E3/UL (ref 4–11)

## 2025-03-06 RX ORDER — BUPROPION HYDROCHLORIDE 150 MG/1
150 TABLET ORAL EVERY MORNING
Qty: 90 TABLET | Refills: 3 | Status: SHIPPED | OUTPATIENT
Start: 2025-03-06

## 2025-03-06 RX ORDER — ESCITALOPRAM OXALATE 5 MG/1
5 TABLET ORAL DAILY
Qty: 90 TABLET | Refills: 3 | Status: SHIPPED | OUTPATIENT
Start: 2025-03-06

## 2025-03-06 NOTE — PATIENT INSTRUCTIONS
Patient Education     Bring in [facility] Directive for your chart.        Recommend calcium 1200mg daily and  vitamin D 1000iu daily in the summer and 2000iu in the fall, winter and spring, and daily exercise with some resistance training to help keep your bones strong. Recent research has shown that daily or every other day weight bearing exercise with resistance training is especially important in maintaining and increasing bone density and preventing osteoporosis.     3 dairy servings/day is recommended to achieve the above if you don't want to supplement.  1 dairy serving = 1-8oz glass of milk, 1-1oz piece of cheese or 1-4oz yogurt.      If you take the above with magnesium 250mg to 400mg daily, you should not get constipation. The magnesium can also help prevent leg cramps and helps the calcium absorb a bit better.     Highly recommend resistance training to help keep bones strong and decrease age -related muscle loss, decreasing insulin resistance and increasing basal metabolic rate to help burn more calories at rest and with exertion.     Look at YouTube for free exercises for basic weight training for biceps, triceps, deltoids, low back, hips, hamstrings, quadriceps, and calf muscles to help strengthen your bones as well as the below exercises.     Also recommend:  a collagen supplement with FORTIGEL , FORTIBONE and /or VERISOL  in it, such as:   Whole Body Collagen by [email or web address].  It is a little expensive.  About $70 per 13.8oz cannister from [employer].  Another is Algae-Oscar collagen supplement. = about $59/ cannister.  You can get both  at some [employer]  or on [employer].com.  They  a finely milled collagen supplement that is unflavored. Some to the others such as Vital Proteins Collagen Peptides have a pretty strong beef/cow or pig flavor to them and may not have  FORTIGEL , FORTIBONE and /or VERISOL  in it.  The evidence on the benefits of whole body collagen on joint health, bone  density and overall skin health are still being studied, but the initial research on the Whole Body Collagen and Algae-Oscar collagen looks promising.  No significant adverse side effects or drug-to-drug interactions have been found as yet.   Look for one with FORTIGEL , FORTIBONE and /or VERISOL  in it.   I have no financial affiliation with Whole Body Collagen or Algae-Oscar.        To prevent and/or treat mild side effects (body aches, chills, fever, fatigue) -not related to allergies - after receiving COVID-19 novel coronavirus vaccine or other vaccines :     Go into your shots very well hydrated and stay well hydrated for 3-4 days afterwards. The reactions to shots are both histamine mediated and inflammatory mediated, so we can mitigate those side effects with mild anti-inflammatories and non-sedating anti-histamines, such as claritin or allegra.     Don't take Ibuprofen prior to your shot, but rather just afterward.  No prescription steroids within 2 weeks of the shots as they can suppress your immune system, but Ibuprofen is not a significant immunosuppressant at the 400mg dosage.   Ok to take tylenol 2-325 or 2-500mg tabs prior to vaccine.    Take claritin (a non-sedating anti-histamine) 10 mg daily or allegra 180mg daily for 3 days and 2 over the counter 200mg  Ibuprofen pills every 4-6 hours with food while awake for the next 2-3 days .  Take them with food so they don't irritate your stomach.  You can do the above regimen for 2-3 days after your first,  second, or third  shots to decrease the possibility of achiness, fever, chills after your COVID-19 novel coronavirus or any current vaccines.  If you can't take Ibuprofen , you can substitute Tylenol 2-325mg tabs every 8 hours or 2-500mg tabs every 12 hours scheduled for 2-3 days after you get your shots instead.           Preventive Care Advice   This is general advice given by our system to help you stay healthy. However, your care team may have specific  advice just for you. Please talk to your care team about your preventive care needs.  Nutrition  Eat 5 or more servings of fruits and vegetables each day.  Try wheat bread, brown rice and whole grain pasta (instead of white bread, rice, and pasta).  Get enough calcium and vitamin D. Check the label on foods and aim for 100% of the RDA (recommended daily allowance).  Lifestyle  Exercise at least 150 minutes each week  (30 minutes a day, 5 days a week).  Do muscle strengthening activities 2 days a week. These help control your weight and prevent disease.  No smoking.  Wear sunscreen to prevent skin cancer.  Have a dental exam and cleaning every 6 months.  Yearly exams  See your health care team every year to talk about:  Any changes in your health.  Any medicines your care team has prescribed.  Preventive care, family planning, and ways to prevent chronic diseases.  Shots (vaccines)   HPV shots (up to age 26), if you've never had them before.  Hepatitis B shots (up to age 59), if you've never had them before.  COVID-19 shot: Get this shot when it's due.  Flu shot: Get a flu shot every year.  Tetanus shot: Get a tetanus shot every 10 years.  Pneumococcal, hepatitis A, and RSV shots: Ask your care team if you need these based on your risk.  Shingles shot (for age 50 and up)  General health tests  Diabetes screening:  Starting at age 35, Get screened for diabetes at least every 3 years.  If you are younger than age 35, ask your care team if you should be screened for diabetes.  Cholesterol test: At age 39, start having a cholesterol test every 5 years, or more often if advised.  Bone density scan (DEXA): At age 50, ask your care team if you should have this scan for osteoporosis (brittle bones).  Hepatitis C: Get tested at least once in your life.  STIs (sexually transmitted infections)  Before age 24: Ask your care team if you should be screened for STIs.  After age 24: Get screened for STIs if you're at risk. You are  at risk for STIs (including HIV) if:  You are sexually active with more than one person.  You don't use condoms every time.  You or a partner was diagnosed with a sexually transmitted infection.  If you are at risk for HIV, ask about PrEP medicine to prevent HIV.  Get tested for HIV at least once in your life, whether you are at risk for HIV or not.  Cancer screening tests  Cervical cancer screening: If you have a cervix, begin getting regular cervical cancer screening tests starting at age 21.  Breast cancer scan (mammogram): If you've ever had breasts, begin having regular mammograms starting at age 40. This is a scan to check for breast cancer.  Colon cancer screening: It is important to start screening for colon cancer at age 45.  Have a colonoscopy test every 10 years (or more often if you're at risk) Or, ask your provider about stool tests like a FIT test every year or Cologuard test every 3 years.  To learn more about your testing options, visit:   .  For help making a decision, visit:   https://bit.ly/kx51480.  Prostate cancer screening test: If you have a prostate, ask your care team if a prostate cancer screening test (PSA) at age 55 is right for you.  Lung cancer screening: If you are a current or former smoker ages 50 to 80, ask your care team if ongoing lung cancer screenings are right for you.  For informational purposes only. Not to replace the advice of your health care provider. Copyright   2023 [facility]. All rights reserved. Clinically reviewed by the [facility]. UNATION 581930 - REV 01/24.  Eating Healthy Foods: Care Instructions  With every meal, you can make healthy food choices. Try to eat a variety of fruits, vegetables, whole grains, lean proteins, and low-fat dairy products. This can help you get the right balance of nutrients, including vitamins and minerals. Small changes add up over time. You can start by adding one healthy food to your meals  "each day.    Try to make half your plate fruits and vegetables, one-fourth whole grains, and one-fourth lean proteins. Try including dairy with your meals.   Eat more fruits and vegetables. Try to have them with most meals and snacks.   Foods for healthy eating        Fruits   These can be fresh, frozen, canned, or dried.  Try to choose whole fruit rather than fruit juice.  Eat a variety of colors.        Vegetables   These can be fresh, frozen, canned, or dried.  Beans, peas, and lentils count too.        Whole grains   Choose whole-grain breads, cereals, and noodles.  Try brown rice.        Lean proteins   These can include lean meat, poultry, fish, and eggs.  You can also have tofu, beans, peas, lentils, nuts, and seeds.        Dairy   Try milk, yogurt, and cheese.  Choose low-fat or fat-free when you can.  If you need to, use lactose-free milk or fortified plant-based milk products, such as soy milk.        Water   Drink water when you're thirsty.  Limit sugar-sweetened drinks, including soda, fruit drinks, and sports drinks.  Where can you learn more?  Go to https://www.[employer].net/patiented  Enter T756 in the search box to learn more about \"Eating Healthy Foods: Care Instructions.\"  Current as of: September 20, 2023  Content Version: 14.3    2024 [employer].   Care instructions adapted under license by your healthcare professional. If you have questions about a medical condition or this instruction, always ask your healthcare professional. [employer] disclaims any warranty or liability for your use of this information.    Learning About Stress  What is stress?     Stress is your body's response to a hard situation. Your body can have a physical, emotional, or mental response. Stress is a fact of life for most people, and it affects everyone differently. What causes stress for you may not be stressful for someone else.  A lot of things can cause stress. You may feel stress when you go " on a job interview, take a test, or run a race. This kind of short-term stress is normal and even useful. It can help you if you need to work hard or react quickly. For example, stress can help you finish an important job on time.  Long-term stress is caused by ongoing stressful situations or events. Examples of long-term stress include long-term health problems, ongoing problems at work, or conflicts in your family. Long-term stress can harm your health.  How does stress affect your health?  When you are stressed, your body responds as though you are in danger. It makes hormones that speed up your heart, make you breathe faster, and give you a burst of energy. This is called the fight-or-flight stress response. If the stress is over quickly, your body goes back to normal and no harm is done.  But if stress happens too often or lasts too long, it can have bad effects. Long-term stress can make you more likely to get sick, and it can make symptoms of some diseases worse. If you tense up when you are stressed, you may develop neck, shoulder, or low back pain. Stress is linked to high blood pressure and heart disease.  Stress also harms your emotional health. It can make you lloyd, tense, or depressed. Your relationships may suffer, and you may not do well at work or school.  What can you do to manage stress?  You can try these things to help manage stress:   Do something active. Exercise or activity can help reduce stress. Walking is a great way to get started. Even everyday activities such as housecleaning or yard work can help.  Try yoga or suze chi. These techniques combine exercise and meditation. You may need some training at first to learn them.  Do something you enjoy. For example, listen to music or go to a movie. Practice your hobby or do volunteer work.  Meditate. This can help you relax, because you are not worrying about what happened before or what may happen in the future.  Do guided imagery. Imagine  "yourself in any setting that helps you feel calm. You can use online videos, books, or a teacher to guide you.  Do breathing exercises. For example:  From a standing position, bend forward from the waist with your knees slightly bent. Let your arms dangle close to the floor.  Breathe in slowly and deeply as you return to a standing position. Roll up slowly and lift your head last.  Hold your breath for just a few seconds in the standing position.  Breathe out slowly and bend forward from the waist.  Let your feelings out. Talk, laugh, cry, and express anger when you need to. Talking with supportive friends or family, a counselor, or a gray leader about your feelings is a healthy way to relieve stress. Avoid discussing your feelings with people who make you feel worse.  Write. It may help to write about things that are bothering you. This helps you find out how much stress you feel and what is causing it. When you know this, you can find better ways to cope.  What can you do to prevent stress?  You might try some of these things to help prevent stress:  Manage your time. This helps you find time to do the things you want and need to do.  Get enough sleep. Your body recovers from the stresses of the day while you are sleeping.  Get support. Your family, friends, and community can make a difference in how you experience stress.  Limit your news feed. Avoid or limit time on social media or news that may make you feel stressed.  Do something active. Exercise or activity can help reduce stress. Walking is a great way to get started.  Where can you learn more?  Go to https://www.[employer].net/patiented  Enter N032 in the search box to learn more about \"Learning About Stress.\"  Current as of: October 24, 2023  Content Version: 14.3    2024 [employer].   Care instructions adapted under license by your healthcare professional. If you have questions about a medical condition or this instruction, always ask your " "healthcare professional. [facility] disclaims any warranty or liability for your use of this information.     Thank you so much or choosing [facility]  for your Health Care. It was a pleasure seeing you at your visit today! Please contact us with any questions or concerns you may have.                   Aniyah Geller MD                              To reach your [facility] care team after hours call:   [phone] press #2 \"to speak with your care team\".  This will get you to our clinic instead of routing to central [facility]  scheduling.     PLEASE NOTE OUR HOURS HAVE CHANGED secondary to COVID-19 coronavirus pandemic, as we are trying to minimize patient exposure to the virus,  which is now widespread in the [facility].  These hours may change with very little notice.  We apologize for any inconvenience.       Our current clinic hours are:          Monday- Thursday   7:00am - 6:00pm  in person.      Friday  7:00am- 5:00pm                       Saturday and Sunday : Closed to in person and virtual visits        We have telephone and virtual visit times available between    7:00am - 6pm on Monday-Friday as well.                                                Phone:  [phone]      Our pharmacy hours: Monday through Friday 8:00am to 5:00pm                        Saturday - 9:00 am to 12 noon       Sunday : Closed.              Phone:  [phone]              ###  Please note: at this time we are not accepting any walk-in visits. ###      There is also information available at our web site:  [email or web address]    If your provider ordered any lab tests and you do not receive the results within 10 business days, please call the clinic.    If you need a medication refill please contact your pharmacy.  Please allow 3 business days for your refill to be completed.    Our clinic offers telephone visits and e visits.  Please ask one of your " team members to explain more.      Use DocLandinghart (secure email communication and access to your chart) to send your primary care provider a message or make an appointment. Ask someone on your Team how to sign up for [employer]ing[employer].
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
## 2025-04-22 DIAGNOSIS — E78.5 HYPERLIPIDEMIA LDL GOAL <160: Primary | ICD-10-CM

## 2025-05-01 ENCOUNTER — HOSPITAL ENCOUNTER (OUTPATIENT)
Dept: MAMMOGRAPHY | Facility: CLINIC | Age: 40
Discharge: HOME | End: 2025-05-01
Attending: FAMILY MEDICINE
Payer: COMMERCIAL

## 2025-05-01 DIAGNOSIS — Z12.31 VISIT FOR SCREENING MAMMOGRAM: ICD-10-CM

## 2025-05-01 PROCEDURE — 77063 BREAST TOMOSYNTHESIS BI: CPT

## 2025-06-06 ENCOUNTER — RESULTS FOLLOW-UP (OUTPATIENT)
Dept: FAMILY MEDICINE | Facility: CLINIC | Age: 40
End: 2025-06-06